# Patient Record
Sex: MALE | Race: WHITE | NOT HISPANIC OR LATINO | Employment: FULL TIME | URBAN - METROPOLITAN AREA
[De-identification: names, ages, dates, MRNs, and addresses within clinical notes are randomized per-mention and may not be internally consistent; named-entity substitution may affect disease eponyms.]

---

## 2017-03-13 DIAGNOSIS — B00.1 RECURRENT COLD SORES: Primary | ICD-10-CM

## 2017-03-13 RX ORDER — VALACYCLOVIR HYDROCHLORIDE 1 G/1
2000 TABLET, FILM COATED ORAL 2 TIMES DAILY
Qty: 4 TABLET | Refills: 3 | Status: SHIPPED | OUTPATIENT
Start: 2017-03-13 | End: 2017-04-04

## 2017-03-13 NOTE — TELEPHONE ENCOUNTER
Reason for call:  Patient reporting a symptom    Symptom or request: Cold Sore Lip-pt is requesting Acyclovir. Could he get this over the phone or does he need an appt.     Duration (how long have symptoms been present): started 3/12/17    Have you been treated for this before? Yes    Phone Number patient can be reached at:  Home number on file [phone] (home)    Best Time:  Any Time    Call taken on 3/13/2017 at 11:12 AM by Renee Salamanca

## 2017-03-13 NOTE — TELEPHONE ENCOUNTER
Disp Refills Start End JAUN   VALTREX 500 MG OR TABS (Discontinued) 10 Tab 4 2010 6/3/2010 --   Si TABLET TWICE DAILY   Class: Fax   Route: Oral   Reason for Discontinue: Therapy completed   Order: [id number]

## 2017-04-04 ENCOUNTER — HOSPITAL ENCOUNTER (OUTPATIENT)
Dept: MRI IMAGING | Facility: CLINIC | Age: 52
Discharge: HOME | End: 2017-04-04
Attending: NURSE PRACTITIONER | Admitting: NURSE PRACTITIONER
Payer: COMMERCIAL

## 2017-04-04 ENCOUNTER — OFFICE VISIT (OUTPATIENT)
Dept: FAMILY MEDICINE | Facility: CLINIC | Age: 52
End: 2017-04-04
Payer: COMMERCIAL

## 2017-04-04 VITALS
HEIGHT: 70 IN | HEART RATE: 60 BPM | TEMPERATURE: 98.3 F | SYSTOLIC BLOOD PRESSURE: 130 MMHG | WEIGHT: 261 LBS | BODY MASS INDEX: 37.37 KG/M2 | DIASTOLIC BLOOD PRESSURE: 80 MMHG

## 2017-04-04 DIAGNOSIS — M25.571 ACUTE RIGHT ANKLE PAIN: Primary | ICD-10-CM

## 2017-04-04 DIAGNOSIS — M25.571 ACUTE RIGHT ANKLE PAIN: ICD-10-CM

## 2017-04-04 PROCEDURE — 99214 OFFICE O/P EST MOD 30 MIN: CPT | Performed by: NURSE PRACTITIONER

## 2017-04-04 PROCEDURE — 73721 MRI JNT OF LWR EXTRE W/O DYE: CPT | Mod: RT

## 2017-04-04 NOTE — PROGRESS NOTES
"  SUBJECTIVE:                                                    Jaime Griffiths is a 51 year old male who presents to clinic today for the following health issues:      Joint Pain- right ankle     Onset: Sudden onset Tayo Morning    Description:   Location: right ankle  Character: Sharp and Dull ache    Intensity: moderate    Progression of Symptoms: worse    Accompanying Signs & Symptoms:  Other symptoms: radiation of pain to back of thigh on Monday   History:   Previous similar pain: YES      Precipitating factors:     Trauma or overuse: YES- Alleviating factors:  Improved by: NSAID - ibuprofen, Ice.      Therapies Tried and outcome: Ibuprofen, Ice, rest, relieves some pain     - Sudden onset on Sunday night. Pain so bad that he wanted to \"cut his foot off\" on Tayo morning.  - Pain is on medial side of ankle and posterior ankle at Achilles tendon.  - Ankle is swollen.  Patient states he has swelling at baseline due to multiple ankle sprains on the right side.   - Constant pain, which varies from dull ache to very sharp stabbing pain.  - Ankle pain generally happens twice a year due to sports injury. Typically with ibu and rest, resolves on its own.   - These past injuries have always been the right ankle when he turns his ankle out for example.   - No injury on Sunday prior to the onset of pain. He did go bowling the day before.  However, he plans with his left leg, so doesn't think this could be the cause of the right ankle pain  - Ice and ibu dulls pain a bit       Problem list and histories reviewed & adjusted, as indicated.  Additional history: as documented    Patient Active Problem List   Diagnosis     Obesity     Elevated BP     Recurrent cold sores     Adenomatous polyp of colon     CARDIOVASCULAR SCREENING; LDL GOAL LESS THAN 160     HTN (hypertension)     Past Surgical History:   Procedure Laterality Date     ARTHROSCOPY KNEE Right 12/4/2014    Procedure: ARTHROSCOPY KNEE;  Surgeon: Wally Mckay, " "MD;  Location: [facility] OR     SURGICAL HISTORY OF -       appendectomy       Social History   Substance Use Topics     Smoking status: Former Smoker     Quit date: 10/4/2014     Smokeless tobacco: Current User     Types: Chew     Alcohol use 12.0 oz/week     24 Cans of beer per week      Comment: 15 beers per week     Family History   Problem Relation Age of Onset     Cancer - colorectal Maternal Grandmother      DIABETES Maternal Grandfather      HEART DISEASE Father 64     onset           Reviewed and updated as needed this visit by clinical staff       Reviewed and updated as needed this visit by Provider         ROS:  Constitutional, HEENT, cardiovascular, pulmonary, gi and gu systems are negative, except as otherwise noted.    OBJECTIVE:                                                    /80  Pulse 60  Temp 98.3  F (36.8  C) (Tympanic)  Ht 5' 10.25\" (1.784 m)  Wt 261 lb (118.4 kg)  BMI 37.18 kg/m2  Body mass index is 37.18 kg/(m^2).  GENERAL: healthy, alert and no distress  MS: Right ankle, swollen and exquisitely tender at medial edge.  Pain is well posterior near the Achilles tendon just above heel.  Range of motion is limited in flexion, extension, and rotation due to pain.    Diagnostic Test Results:  MRI scheduled this afternoon.     ASSESSMENT/PLAN:                                                      1. Acute right ankle pain  Uncertain if this is a strain or sprain versus something more concerning.  He does have a history of sprains and strains, tendinitis on the left side. Painful to stand on.  Pain bothers him all the time and ibuprofen and Tylenol only take the edge off.  I think we should do an MRI for further assessment.  Patient agrees.  - MR Ankle Right w/o Contrast; Future  - diclofenac (VOLTAREN) 50 MG EC tablet; Take 1 tablet (50 mg) by mouth 3 times daily as needed for moderate pain  Dispense: 90 tablet; Refill: 1  - Stop all other NSAIDs while on diclofenac.  Patient " understands    Patient Instructions   Your MRI is scheduled for 1:30 today at the [facility]. Please arrive by 1:15    TT spent: 20 minutes of which 20 minutes were spent in direct face to face contact with patient/family. Patient teaching done regarding: follow up. Greater than 50% of time spent counseling and/or coordinating care.     Feli Farrell NP, APRN [facility]

## 2017-04-04 NOTE — MR AVS SNAPSHOT
After Visit Summary   4/4/2017    Jaime Griffiths    MRN: [id number]           Patient Information     Date Of Birth          1965        Visit Information        Provider Department      4/4/2017 9:20 AM Feli Farrell APRN [facility]        Today's Diagnoses     Acute right ankle pain    -  1      Care Instructions    Your MRI is scheduled for 1:30 today at the [facility]. Please arrive by 1:15        Follow-ups after your visit        Your next 10 appointments already scheduled     Apr 04, 2017  1:30 PM CDT   MR ANKLE RIGHT W/O CONTRAST with [facility]   [facility] ([facility])    [address] [address]   [phone]           Take your medicines as usual, unless your doctor tells you not to. Bring a list of your current medicines to your exam (including vitamins, minerals and over-the-counter drugs). Also bring the results of similar scans you may have had.  Please remove any body piercings and hair extensions before you arrive.  Follow your doctor s orders. If you do not, we may have to postpone your exam.  You will not have contrast for this exam. You do not need to do anything special to prepare.  The MRI machine uses a strong magnet. Please wear clothes without metal (snaps, zippers). A sweatsuit works well, or we may give you a hospital gown.   **IMPORTANT** THE INSTRUCTIONS BELOW ARE ONLY FOR THOSE PATIENTS WHO HAVE BEEN TOLD THEY WILL RECEIVE SEDATION OR GENERAL ANESTHESIA DURING THEIR MRI PROCEDURE:  IF YOU WILL RECEIVE SEDATION (take medicine to help you relax during your exam):   You must get the medicine from your doctor before you arrive. Bring the medicine to the exam. Do not take it at home.   Arrive one hour early. Bring someone who can take you home after the test. Your medicine will make you sleepy. After the exam, you may not drive, take a bus or take a taxi by yourself.   No eating 8 hours before your  "exam. You may have clear liquids up until 4 hours before your exam. (Clear liquids include water, clear tea, black coffee and fruit juice without pulp.)  IF YOU WILL RECEIVE ANESTHESIA (be asleep for your exam):   Arrive 1 1/2 hours early. Bring someone who can take you home after the test. You may not drive, take a bus or take a taxi by yourself.   No eating 8 hours before your exam. You may have clear liquids up until 4 hours before your exam. (Clear liquids include water, clear tea, black coffee and fruit juice without pulp.)   You will spend four to five hours in the recovery room.  Please call the Imaging Department at your exam site with any questions.              Future tests that were ordered for you today     Open Future Orders        Priority Expected Expires Ordered    MR Ankle Right w/o Contrast Routine  4/4/2018 4/4/2017            Who to contact     If you have questions or need follow up information about today's clinic visit or your schedule please contact [facility] directly at [phone].  Normal or non-critical lab and imaging results will be communicated to you by [employer], letter or phone within 4 business days after the clinic has received the results. If you do not hear from us within 7 days, please contact the clinic through [email or web address] or phone. If you have a critical or abnormal lab result, we will notify you by phone as soon as possible.  Submit refill requests through [employer] or call your pharmacy and they will forward the refill request to us. Please allow 3 business days for your refill to be completed.          Additional Information About Your Visit        StudySoup Information     StudySoup lets you send messages to your doctor, view your test results, renew your prescriptions, schedule appointments and more. To sign up, go to [email or web address] . Click on \"Log in\" on the left side of the screen, which will take you to the Welcome page. Then click on \"Sign up Now\" on " "the right side of the page.     You will be asked to enter the access code listed below, as well as some personal information. Please follow the directions to create your username and password.     Your access code is: [email or web address]  Expires: 7/3/2017  9:49 AM     Your access code will  in 90 days. If you need help or a new code, please call your [facility] clinic or [phone].        Care EveryWhere ID     This is your Care EveryWhere ID. This could be used by other organizations to access your [facility] medical records  [id number]        Your Vitals Were     Pulse Temperature Height BMI (Body Mass Index)          60 98.3  F (36.8  C) (Tympanic) 5' 10.25\" (1.784 m) 37.18 kg/m2         Blood Pressure from Last 3 Encounters:   17 130/80   10/28/16 (!) 169/93   16 132/80    Weight from Last 3 Encounters:   17 261 lb (118.4 kg)   16 247 lb 8 oz (112.3 kg)   16 247 lb 6.4 oz (112.2 kg)               Primary Care Provider Office Phone # Fax #    Rd Gayle [phone]       [facility] [address] [place] [address]        Thank you!     Thank you for choosing [facility]  for your care. Our goal is always to provide you with excellent care. Hearing back from our patients is one way we can continue to improve our services. Please take a few minutes to complete the written survey that you may receive in the mail after your visit with us. Thank you!             Your Updated Medication List - Protect others around you: Learn how to safely use, store and throw away your medicines at www.disposemymeds.org.      Notice  As of 2017  9:50 AM    You have not been prescribed any medications.      "

## 2017-04-05 ENCOUNTER — TELEPHONE (OUTPATIENT)
Dept: FAMILY MEDICINE | Facility: CLINIC | Age: 52
End: 2017-04-05

## 2017-04-06 NOTE — TELEPHONE ENCOUNTER
Pt informed as noted per Dr. Gayle. Will forward to DIVINA Robertson, for review upon her RTC clinic tomorrow.

## 2017-04-06 NOTE — TELEPHONE ENCOUNTER
Some swelling/inflammation and some chronic tendonitis and arthritis findings.   I would defer to Ailyn on correlating the MRI findings to his symptoms, as I didn't see him.  She  Can go over with in in detail.

## 2017-04-06 NOTE — TELEPHONE ENCOUNTER
Pt said he did not get a call back yet. Could the Nurse take a look at it?   [facility]

## 2017-09-05 ENCOUNTER — RADIANT APPOINTMENT (OUTPATIENT)
Dept: GENERAL RADIOLOGY | Facility: CLINIC | Age: 52
End: 2017-09-05
Attending: NURSE PRACTITIONER
Payer: COMMERCIAL

## 2017-09-05 ENCOUNTER — OFFICE VISIT (OUTPATIENT)
Dept: FAMILY MEDICINE | Facility: CLINIC | Age: 52
End: 2017-09-05
Payer: COMMERCIAL

## 2017-09-05 VITALS
SYSTOLIC BLOOD PRESSURE: 148 MMHG | BODY MASS INDEX: 36.79 KG/M2 | HEART RATE: 66 BPM | WEIGHT: 257 LBS | HEIGHT: 70 IN | DIASTOLIC BLOOD PRESSURE: 92 MMHG | TEMPERATURE: 97.9 F

## 2017-09-05 DIAGNOSIS — M25.572 ACUTE LEFT ANKLE PAIN: Primary | ICD-10-CM

## 2017-09-05 DIAGNOSIS — M25.572 ACUTE LEFT ANKLE PAIN: ICD-10-CM

## 2017-09-05 PROCEDURE — 99213 OFFICE O/P EST LOW 20 MIN: CPT | Performed by: NURSE PRACTITIONER

## 2017-09-05 PROCEDURE — 73610 X-RAY EXAM OF ANKLE: CPT | Mod: LT

## 2017-09-05 RX ORDER — INDOMETHACIN 25 MG/1
25 CAPSULE ORAL
Qty: 42 CAPSULE | Refills: 1 | Status: SHIPPED | OUTPATIENT
Start: 2017-09-05 | End: 2018-02-28

## 2017-09-05 NOTE — PROGRESS NOTES
SUBJECTIVE:   Jaime Griffiths is a 52 year old male who presents to clinic today for the following health issues:      Joint Pain    Onset: 1 day ago    Description:   Location: left ankle  Character: Sharp, Dull ache, Gnawing and Burning    Intensity: severe    Progression of Symptoms: worse    Accompanying Signs & Symptoms:  Other symptoms: swelling    History:   Previous similar pain: YES- tendonitis      Precipitating factors:   Trauma or overuse: no     Alleviating factors:  Improved by: nothing    Therapies Tried and outcome: none    Last night slept only 2 hours.  Pain started yesterday mornig, took advil and worsened as day went by.   2 days ago was out working in the [employer] in [place], no injury but wonders if they may have contributed.   Sunday OK, felt fine.    Problem list and histories reviewed & adjusted, as indicated.  Additional history: as documented    Patient Active Problem List   Diagnosis     Obesity     Elevated BP     Recurrent cold sores     Adenomatous polyp of colon     CARDIOVASCULAR SCREENING; LDL GOAL LESS THAN 160     HTN (hypertension)     Past Surgical History:   Procedure Laterality Date     ARTHROSCOPY KNEE Right 12/4/2014    Procedure: ARTHROSCOPY KNEE;  Surgeon: Wally Mckay MD;  Location: [facility]     SURGICAL HISTORY OF -       appendectomy       Social History   Substance Use Topics     Smoking status: Former Smoker     Quit date: 10/4/2014     Smokeless tobacco: Current User     Types: Chew     Alcohol use 12.0 oz/week     24 Cans of beer per week      Comment: 15 beers per week     Family History   Problem Relation Age of Onset     Cancer - colorectal Maternal Grandmother      DIABETES Maternal Grandfather      HEART DISEASE Father 64     onset         BP Readings from Last 3 Encounters:   09/14/17 (!) 148/102   09/05/17 (!) 148/92   04/04/17 130/80    Wt Readings from Last 3 Encounters:   09/14/17 257 lb (116.6 kg)   09/06/17 257 lb (116.6 kg)   09/05/17 257 lb (116.6 kg)     "        Reviewed and updated as needed this visit by clinical staffTobacco  Allergies  Meds  Problems  Med Hx  Surg Hx  Fam Hx  Soc Hx        Reviewed and updated as needed this visit by Provider  Allergies  Meds  Problems         ROS:  Constitutional, HEENT, cardiovascular, pulmonary, GI, , musculoskeletal, neuro, skin, endocrine and psych systems are negative, except as otherwise noted.      OBJECTIVE:   BP (!) 148/92  Pulse 66  Temp 97.9  F (36.6  C) (Tympanic)  Ht 5' 10.25\" (1.784 m)  Wt 257 lb (116.6 kg)  BMI 36.61 kg/m2  Body mass index is 36.61 kg/(m^2).  GENERAL: healthy, alert and no distress  NECK: no adenopathy, no asymmetry, masses, or scars and thyroid normal to palpation  RESP: lungs clear to auscultation - no rales, rhonchi or wheezes  CV: regular rate and rhythm, normal S1 S2, no S3 or S4, no murmur, click or rub, no peripheral edema and peripheral pulses strong  MS: left ankle is mildly swollen, pain noted with palpation if medial malleolus and surrounding tissue    Diagnostic Test Results:  Xray - LEFT ANKLE THREE OR MORE VIEWS  9/5/2017 2:44 PM      HISTORY: Acute left medial malleolus pain, foot and ankle swelling.  Past MRI done in 2014 due to pain and swelling.     COMPARISON: September 16, 2010         IMPRESSION: Ankle mortise intact. Ossicles again noted adjacent to the  medial malleolus, similar in appearance since previous exam. Mild soft  tissue swelling throughout the level of the ankle. No definite acute  fracture.     KYLEIGH MARTINEZ MD    ASSESSMENT/PLAN:     ASSESSMENT:  1. Acute left ankle pain        PLAN:  Orders Placed This Encounter     XR Ankle Left G/E 3 Views     indomethacin (INDOCIN) 25 MG capsule       Patient Instructions   You have an appointment with Dr. Mcclellan here on Wednesday at 3:20 pm. Please arrive 15 minutes early.    Take the Indocin 25 mg orally 3 times a day with meals.    No other NSAIDS ( Ibuprofen) while on the Indocin    Ok to take Tylenol " [facility] the [facility]      Feli Farrell, NP, APRN CNP  [facility]

## 2017-09-05 NOTE — LETTER
September 5, 2017      Jaime Griffiths  [address]  [address]   [place] [address]        To Whom It May Concern:    Jaime Griffiths was seen in our clinic. He may return to work on or about 9/7/17.      Sincerely,        Feli Farrell, NP, APRN CNP

## 2017-09-05 NOTE — NURSING NOTE
"Chief Complaint   Patient presents with     Musculoskeletal Problem     Rt. ankle       Initial There were no vitals taken for this visit. Estimated body mass index is 37.18 kg/(m^2) as calculated from the following:    Height as of 4/4/17: 5' 10.25\" (1.784 m).    Weight as of 4/4/17: 261 lb (118.4 kg).  Medication Reconciliation: complete   Fanta Son CMA      "

## 2017-09-05 NOTE — PATIENT INSTRUCTIONS
You have an appointment with Dr. Mcclellan here on Wednesday at 3:20 pm. Please arrive 15 minutes early.    Take the Indocin 25 mg orally 3 times a day with meals.    No other NSAIDS ( Ibuprofen) while on the Indocin    Ok to take Tylenol while on the Indocin

## 2017-09-05 NOTE — MR AVS SNAPSHOT
"              After Visit Summary   9/5/2017    Jaime Griffiths    MRN: [id number]           Patient Information     Date Of Birth          1965        Visit Information        Provider Department      9/5/2017 1:40 PM Feli Farrell APRN CNP [facility]        Today's Diagnoses     Acute left ankle pain    -  1      Care Instructions    You have an appointment with Dr. Mcclellan here on Wednesday at 3:20 pm. Please arrive 15 minutes early.    Take the Indocin 25 mg orally 3 times a day with meals.    No other NSAIDS ( Ibuprofen) while on the Indocin    Ok to take Tylenol while on the Indocin          Follow-ups after your visit        Your next 10 appointments already scheduled     Sep 06, 2017  3:20 PM CDT   New Visit with Salinas Mcclellan DPM   [facility] ([facility])    [address] [place] [address] [phone]              Who to contact     If you have questions or need follow up information about today's clinic visit or your schedule please contact [facility] directly at [phone].  Normal or non-critical lab and imaging results will be communicated to you by [employer], letter or phone within 4 business days after the clinic has received the results. If you do not hear from us within 7 days, please contact the clinic through [employer] or phone. If you have a critical or abnormal lab result, we will notify you by phone as soon as possible.  Submit refill requests through [employer] or call your pharmacy and they will forward the refill request to us. Please allow 3 business days for your refill to be completed.          Additional Information About Your Visit        MyChart Information     Contix lets you send messages to your doctor, view your test results, renew your prescriptions, schedule appointments and more. To sign up, go to [email or web address] . Click on \"Log in\" on the left side of the screen, which will take you to " "the Welcome page. Then click on \"Sign up Now\" on the right side of the page.     You will be asked to enter the access code listed below, as well as some personal information. Please follow the directions to create your username and password.     Your access code is: [email or web address]  Expires: 2017  2:47 PM     Your access code will  in 90 days. If you need help or a new code, please call your [facility] clinic or [phone].        Care EveryWhere ID     This is your Care EveryWhere ID. This could be used by other organizations to access your [facility] medical records  [id number]        Your Vitals Were     Pulse Temperature Height BMI (Body Mass Index)          66 97.9  F (36.6  C) (Tympanic) 5' 10.25\" (1.784 m) 36.61 kg/m2         Blood Pressure from Last 3 Encounters:   17 (!) 148/92   17 130/80   10/28/16 (!) 169/93    Weight from Last 3 Encounters:   17 257 lb (116.6 kg)   17 261 lb (118.4 kg)   16 247 lb 8 oz (112.3 kg)                 Today's Medication Changes          These changes are accurate as of: 17  2:47 PM.  If you have any questions, ask your nurse or doctor.               Start taking these medicines.        Dose/Directions    indomethacin 25 MG capsule   Commonly known as:  INDOCIN   Used for:  Acute left ankle pain   Started by:  Feli Farrell APRN CNP        Dose:  25 mg   Take 1 capsule (25 mg) by mouth 3 times daily (with meals)   Quantity:  42 capsule   Refills:  1            Where to get your medicines      These medications [place] [facility] [place] [place] [address]  [address] [place] [address] [address], [facility] [address]     Phone:  [phone]     indomethacin 25 MG capsule                Primary Care Provider Office Phone # Fax #    Rd Gayle [phone]       [address] [facility] [address]        Equal Access to Services     BRIT MORA AH: christina Everett, " marissa susimplinda chávezjacquelineshlomoorlando sudeepnatalie corbin. So [facility] [phone].    ATENCIÓN: Si gabe wu, tiene a bruce disposición servicios gratuitos de asistencia lingüística. Yasmeen al [phone].    We comply with applicable federal civil rights laws and [place] laws. We do not discriminate on the basis of race, color, national origin, age, disability sex, sexual orientation or gender identity.            Thank you!     Thank you for choosing [facility]  for your care. Our goal is always to provide you with excellent care. Hearing back from our patients is one way we can continue to improve our services. Please take a few minutes to complete the written survey that you may receive in the mail after your visit with us. Thank you!             Your Updated Medication List - Protect others around you: Learn how to safely use, store and throw away your medicines at www.disposemymeds.org.          This list is accurate as of: 9/5/17  2:47 PM.  Always use your most recent med list.                   Brand Name Dispense Instructions for use Diagnosis    diclofenac 50 MG EC tablet    VOLTAREN    90 tablet    Take 1 tablet (50 mg) by mouth 3 times daily as needed for moderate pain    Acute right ankle pain       indomethacin 25 MG capsule    INDOCIN    42 capsule    Take 1 capsule (25 mg) by mouth 3 times daily (with meals)    Acute left ankle pain

## 2017-09-06 ENCOUNTER — OFFICE VISIT (OUTPATIENT)
Dept: PODIATRY | Facility: CLINIC | Age: 52
End: 2017-09-06
Payer: COMMERCIAL

## 2017-09-06 VITALS — HEIGHT: 70 IN | WEIGHT: 257 LBS | BODY MASS INDEX: 36.79 KG/M2

## 2017-09-06 DIAGNOSIS — M77.52 LEFT ANKLE TENDINITIS: ICD-10-CM

## 2017-09-06 DIAGNOSIS — M10.072 ACUTE IDIOPATHIC GOUT OF LEFT ANKLE: Primary | ICD-10-CM

## 2017-09-06 PROCEDURE — 99203 OFFICE O/P NEW LOW 30 MIN: CPT | Performed by: PODIATRIST

## 2017-09-06 RX ORDER — METHYLPREDNISOLONE 4 MG
4 TABLET, DOSE PACK ORAL SEE ADMIN INSTRUCTIONS
Qty: 21 TABLET | Refills: 0 | Status: SHIPPED | OUTPATIENT
Start: 2017-09-06 | End: 2017-09-14

## 2017-09-06 NOTE — PATIENT INSTRUCTIONS
Initial musculoskeletal treatment recommendation:    1.  Stretch the calf muscles as instructed once an hour.  2.  Ice the injured area in the evening; 20 min on/off.  3.  Take antiinflammatory medication as directed.  4.  Massage the soft tissues around the injured area in the morning to loosen the tissue  5.  Wear supportive foot wear and/or arch supports (rigid not cushion).      If no improvement in symptoms within four to six weeks, return to clinic for reevaluation.  GOUT  Gout is a disorder that results from the build-up of uric acid in the tissues or a joint. It most often affects the joint of the big toe.    CAUSES  Gout attacks are caused by deposits of crystallized uric acid in the joint. Uric acid is present in the blood and eliminated in the urine, but in people who have gout, uric acid accumulates and crystallizes in the joints. Uric acid is the result of the breakdown of purines, chemicals that are found naturally in our bodies and in food. Some people develop gout because their kidneys have difficulty eliminating normal amounts of uric acid, while others produce too much uric acid.  Gout occurs most commonly in the big toe because uric acid is sensitive to temperature changes. At cooler temperatures, uric acid turns into crystals. Since the toe is the part of the body that is farthest from the heart, it s also the coolest part of the body   and, thus, the most likely target of gout. However, gout can affect any joint in the body.  The tendency to accumulate uric acid is often inherited. Other factors that put a person at risk for developing gout include: high blood pressure, diabetes, obesity, surgery, chemotherapy, stress, and certain medications and vitamins. For example, the body s ability to remove uric acid can be negatively affected by taking aspirin, some diuretic medications ( water pills ), and the vitamin niacin (also called nicotinic acid). While gout is more common in men aged 40 to 60  years, it can occur in younger men as well as in women.  Consuming foods and beverages that contain high levels of purines can trigger an attack of gout. Some foods contain more purines than others and have been associated with an increase of uric acid, which leads to gout. You may be able to reduce your chances of getting a gout attack by limiting or avoiding shellfish, organ meats (kidney, liver, etc.), red wine, beer, and red meat.  Other Triggers include but are not limited to:  Congestive heart failure, deep vein thrombosis, pneumonia, fasting, dehydration, trauma/surgery, psoriasis.  SYMTOMS  An attack of gout can be miserable, marked by the following symptoms:  Intense pain that comes on suddenly   often in the middle of the night or upon arising   Signs of inflammation such as redness, swelling, and warmth over the joint.   DIAGNOSIS  To diagnose gout, the foot and ankle surgeon will ask questions about your personal and family medical history, followed by an examination of the affected joint. Laboratory tests and x-rays are sometimes ordered to determine if the inflammation is caused by something other than gout.  TREATMENT  Initial treatment of an attack of gout typically includes the following:  Medications. Prescription medications or injections are used to treat the pain, swelling, and inflammation.   Dietary restrictions. Foods and beverages that are high in purines should be avoided, since purines are converted in the body to uric acid.   Fluids. Drink plenty of water and other fluids each day, while also avoiding alcoholic beverages, which cause dehydration. Cherry Juice works well to help decrease pain.  Immobilize and elevate the foot. Avoid standing and walking to give your foot a rest. Also, elevate your foot (level with or slightly above the heart) to help reduce swelling.   The symptoms of gout and the inflammatory process usually resolve in three to ten days with treatment. If gout symptoms  continue despite the initial treatment, or if repeated attacks occur, see your primary care physician for maintenance treatment that may involve daily medication. In cases of repeated episodes, the underlying problem must be addressed, as the build-up of uric acid over time can cause arthritic damage to the joint.  DIET CHANGE  A gout diet reduces your intake of foods that are high in purines, which helps control your body's production of uric acid. If you're overweight or obese, lose weight. However, avoid fasting and rapid weight loss because these can promote a gout attack. Drink plenty of fluids to help flush uric acid from your body. Also avoid high-protein diets, which can cause you to produce too much uric acid (hyperuricemia).   To follow the diet:   Limit meat, poultry and fish. Animal proteins are high in purine. Avoid or severely limit high-purine foods, such as organ meats, herring, anchovies and mackerel. Red meat (beef, pork and lamb), fatty fish and seafood (tuna, shrimp, lobster and scallops) are associated with increased risk of gout. Because all meat, poultry and fish contain purines, limit your intake to 4 to 6 ounces (113 to 170 grams) daily.   Eat more plant-based proteins. You can increase your protein by including more plant-based sources, such as beans and legumes. This switch will also help you cut down on saturated fats, which may indirectly contribute to obesity and gout.   Limit or avoid alcohol. Alcohol interferes with the elimination of uric acid from your body. Drinking beer, in particular, has been linked to gout attacks. If you're having an attack, avoid alcohol. However, when you're not having an attack, drinking one or two 5-ounce (148 milliliter) servings a day of wine is not likely to increase your risk.   Drink plenty of fluids, particularly water. Fluids can help remove uric acid from your body. Aim for eight to 16 8-ounce (237 milliliter) glasses a day.   Choose low-fat or  fat-free dairy products. Some studies have shown that drinking skim or low-fat milk and eating foods made with them, such as yogurt, help reduce the risk of gout. Aim for adequate dairy intake of 16 to 24 fluid ounces (473 to 710 milliliters) daily.   Choose complex carbohydrates. Eat more whole grains and fruits and vegetables and fewer refined carbohydrates, such as white bread, cakes and candy.   Limit or avoid sugar. Too many sweets can leave you with no room for plant-based proteins and low-fat or fat-free dairy products   the foods you need to avoid gout. Sugary foods also tend to be high in calories, so they make it easier to eat more than you're likely to burn off. Although there's debate about whether sugar has a direct effect on uric acid levels, sweets are definitely linked to overweight and obesity.   There's also some evidence that drinking four to six cups of coffee a day lowers gout risk in men.   RESULTS  Following a gout diet can help you limit your body's uric acid production and increase its elimination. It's not likely to lower the uric acid concentration in your blood enough to treat your gout without medication, but it may help decrease the number of attacks and limit their severity. Following the gout diet and limiting your calories   particularly if you also add in moderate daily exercise, such as brisk walking   also can improve your overall health by helping you achieve and maintain a healthy weight.

## 2017-09-06 NOTE — LETTER
9/6/2017         RE: Jaime Griffiths  [address]  [address]   [place] [id number]        Dear Colleague,    Thank you for referring your patient, Jaime Griffiths, to the [facility]. Please see a copy of my visit note below.    PATIENT HISTORY:  Jaime Griffiths is a 52 year old male who presents to clinic for a painful left foot .  The patient describes the pain as sharp stabbing.  The patient relates the pain level is moderate.  The patient relates pain is located in the back of the left ankle.  The patient relates the pain has been present for the past several days.  The patient relates pain with ambulation.  The patient has tried ice with little relief.        REVIEW OF SYSTEMS:  Constitutional, HEENT, cardiovascular, pulmonary, GI, , musculoskeletal, neuro, skin, endocrine and psych systems are negative, except as otherwise noted.     PAST MEDICAL HISTORY:   Past Medical History:   Diagnosis Date     Allergic rhinitis, cause unspecified         PAST SURGICAL HISTORY:   Past Surgical History:   Procedure Laterality Date     ARTHROSCOPY KNEE Right 12/4/2014    Procedure: ARTHROSCOPY KNEE;  Surgeon: Wally Mckay MD;  Location: [facility] OR     SURGICAL HISTORY OF -       appendectomy        MEDICATIONS:   Current Outpatient Prescriptions:      order for DME, Trilok Ankle Brace, Disp: 1 Device, Rfl: 0     methylPREDNISolone (MEDROL DOSEPAK) 4 MG tablet, Take 1 tablet (4 mg) by mouth See Admin Instructions, Disp: 21 tablet, Rfl: 0     indomethacin (INDOCIN) 25 MG capsule, Take 1 capsule (25 mg) by mouth 3 times daily (with meals), Disp: 42 capsule, Rfl: 1     diclofenac (VOLTAREN) 50 MG EC tablet, Take 1 tablet (50 mg) by mouth 3 times daily as needed for moderate pain, Disp: 90 tablet, Rfl: 1     ALLERGIES:    Allergies   Allergen Reactions     Nka [No Known Allergies]         SOCIAL HISTORY:   Social History     Social History     Marital status:      Spouse name: N/A     Number of  "children: N/A     Years of education: N/A     Occupational History     Not on file.     Social History Main Topics     Smoking status: Former Smoker     Quit date: 10/4/2014     Smokeless tobacco: Current User     Types: Chew     Alcohol use 12.0 oz/week     24 Cans of beer per week      Comment: 15 beers per week     Drug use: No     Sexual activity: Yes     Partners: Female     Other Topics Concern     Parent/Sibling W/ Cabg, Mi Or Angioplasty Before 65f 55m? No     father     Social History Narrative        FAMILY HISTORY:   Family History   Problem Relation Age of Onset     Cancer - colorectal Maternal Grandmother      DIABETES Maternal Grandfather      HEART DISEASE Father 64     onset        EXAM:Vitals: Ht 1.784 m (5' 10.25\")  Wt 116.6 kg (257 lb)  BMI 36.61 kg/m2  BMI= Body mass index is 36.61 kg/(m^2).    Weight management plan: Patient was referred to their PCP to discuss a diet and exercise plan.    General appearance: Patient is alert and fully cooperative with history & exam.  No sign of distress is noted during the visit.     Psychiatric: Affect is pleasant & appropriate.  Patient appears motivated to improve health.     Respiratory: Breathing is regular & unlabored while sitting.     HEENT: Hearing is intact to spoken word.  Speech is clear.  No gross evidence of visual impairment that would impact ambulation.     Dermatologic: Skin is intact to both lower extremities without significant lesions, rash or abrasion.  No paronychia or evidence of soft tissue infection is noted.     Vascular: DP & PT pulses are intact & regular bilaterally.  No significant edema or varicosities noted.  CFT and skin temperature is normal to both lower extremities.     Neurologic: Lower extremity sensation is intact to light touch.  No evidence of weakness or contracture in the lower extremities.  No evidence of neuropathy.     Musculoskeletal: Patient is ambulatory without assistive device or brace.  No gross ankle " deformity noted.  No foot or ankle joint effusion is noted.  No pain on palpation over the extensor tendons on the left ankle. No surrounding erythema noted. No notes moderate edema.    Radiographs were evaluated including AP, lateral and medial oblique views of the left foot reveals  no cortical erosions or periosteal elevation.  All joint margins appear stable.  There is no apparent fracture or tumor formation noted.  There is no evidence of foreign body.    ASSESSMENT / PLAN:     ICD-10-CM    1. Acute idiopathic gout of left ankle M10.072 methylPREDNISolone (MEDROL DOSEPAK) 4 MG tablet   2. Left ankle tendinitis M77.52 order for DME     methylPREDNISolone (MEDROL DOSEPAK) 4 MG tablet       I have explained to Jaime  about the conditions.  We discussed the nature of the condition as well as the treatment plan and expected length of recovery.  At this time, the patient was instructed on icing, stretching, tissue massage and support.  The patient was fitted with a Tri-Lock ankle brace that will aid in offloading the tension forces to the soft tissues and prevent further inflammation.  To reduce the amount of current inflammation, the patient was prescribed a Medrol Dosepak to be taken daily with food and instructed to stop taking if any stomach irritation or swelling in extremities are noted.  The patient will return in four weeks for reevaluation if the symptoms do not resolve.          Disclaimer: This note consists of symbols derived from keyboarding, dictation and/or voice recognition software. As a result, there may be errors in the script that have gone undetected. Please consider this when interpreting information found in this chart.       DIAZ Mcclellan D.P.M., F.A.C.F.A.S.        Again, thank you for allowing me to participate in the care of your patient.        Sincerely,        Salinas Mcclellan DPM

## 2017-09-06 NOTE — MR AVS SNAPSHOT
After Visit Summary   9/6/2017    Jaime Griffiths    MRN: [id number]           Patient Information     Date Of Birth          1965        Visit Information        Provider Department      9/6/2017 3:20 PM Salinas Mcclellan DPM [facility]        Today's Diagnoses     Acute idiopathic gout of left ankle    -  1    Left ankle tendinitis          Care Instructions    Initial musculoskeletal treatment recommendation:    1.  Stretch the calf muscles as instructed once an hour.  2.  Ice the injured area in the evening; 20 min on/off.  3.  Take antiinflammatory medication as directed.  4.  Massage the soft tissues around the injured area in the morning to loosen the tissue  5.  Wear supportive foot wear and/or arch supports (rigid not cushion).      If no improvement in symptoms within four to six weeks, return to clinic for reevaluation.  GOUT  Gout is a disorder that results from the build-up of uric acid in the tissues or a joint. It most often affects the joint of the big toe.    CAUSES  Gout attacks are caused by deposits of crystallized uric acid in the joint. Uric acid is present in the blood and eliminated in the urine, but in people who have gout, uric acid accumulates and crystallizes in the joints. Uric acid is the result of the breakdown of purines, chemicals that are found naturally in our bodies and in food. Some people develop gout because their kidneys have difficulty eliminating normal amounts of uric acid, while others produce too much uric acid.  Gout occurs most commonly in the big toe because uric acid is sensitive to temperature changes. At cooler temperatures, uric acid turns into crystals. Since the toe is the part of the body that is farthest from the heart, it s also the coolest part of the body - and, thus, the most likely target of gout. However, gout can affect any joint in the body.  The tendency to accumulate uric acid is often inherited. Other factors that  put a person at risk for developing gout include: high blood pressure, diabetes, obesity, surgery, chemotherapy, stress, and certain medications and vitamins. For example, the body s ability to remove uric acid can be negatively affected by taking aspirin, some diuretic medications ( water pills ), and the vitamin niacin (also called nicotinic acid). While gout is more common in men aged 40 to 60 years, it can occur in younger men as well as in women.  Consuming foods and beverages that contain high levels of purines can trigger an attack of gout. Some foods contain more purines than others and have been associated with an increase of uric acid, which leads to gout. You may be able to reduce your chances of getting a gout attack by limiting or avoiding shellfish, organ meats (kidney, liver, etc.), red wine, beer, and red meat.  Other Triggers include but are not limited to:  Congestive heart failure, deep vein thrombosis, pneumonia, fasting, dehydration, trauma/surgery, psoriasis.  SYMTOMS  An attack of gout can be miserable, marked by the following symptoms:  Intense pain that comes on suddenly - often in the middle of the night or upon arising   Signs of inflammation such as redness, swelling, and warmth over the joint.   DIAGNOSIS  To diagnose gout, the foot and ankle surgeon will ask questions about your personal and family medical history, followed by an examination of the affected joint. Laboratory tests and x-rays are sometimes ordered to determine if the inflammation is caused by something other than gout.  TREATMENT  Initial treatment of an attack of gout typically includes the following:  Medications. Prescription medications or injections are used to treat the pain, swelling, and inflammation.   Dietary restrictions. Foods and beverages that are high in purines should be avoided, since purines are converted in the body to uric acid.   Fluids. Drink plenty of water and other fluids each day, while also  avoiding alcoholic beverages, which cause dehydration. Cherry Juice works well to help decrease pain.  Immobilize and elevate the foot. Avoid standing and walking to give your foot a rest. Also, elevate your foot (level with or slightly above the heart) to help reduce swelling.   The symptoms of gout and the inflammatory process usually resolve in three to ten days with treatment. If gout symptoms continue despite the initial treatment, or if repeated attacks occur, see your primary care physician for maintenance treatment that may involve daily medication. In cases of repeated episodes, the underlying problem must be addressed, as the build-up of uric acid over time can cause arthritic damage to the joint.  DIET CHANGE  A gout diet reduces your intake of foods that are high in purines, which helps control your body's production of uric acid. If you're overweight or obese, lose weight. However, avoid fasting and rapid weight loss because these can promote a gout attack. Drink plenty of fluids to help flush uric acid from your body. Also avoid high-protein diets, which can cause you to produce too much uric acid (hyperuricemia).   To follow the diet:   Limit meat, poultry and fish. Animal proteins are high in purine. Avoid or severely limit high-purine foods, such as organ meats, herring, anchovies and mackerel. Red meat (beef, pork and lamb), fatty fish and seafood (tuna, shrimp, lobster and scallops) are associated with increased risk of gout. Because all meat, poultry and fish contain purines, limit your intake to 4 to 6 ounces (113 to 170 grams) daily.   Eat more plant-based proteins. You can increase your protein by including more plant-based sources, such as beans and legumes. This switch will also help you cut down on saturated fats, which may indirectly contribute to obesity and gout.   Limit or avoid alcohol. Alcohol interferes with the elimination of uric acid from your body. Drinking beer, in particular,  has been linked to gout attacks. If you're having an attack, avoid alcohol. However, when you're not having an attack, drinking one or two 5-ounce (148 milliliter) servings a day of wine is not likely to increase your risk.   Drink plenty of fluids, particularly water. Fluids can help remove uric acid from your body. Aim for eight to 16 8-ounce (237 milliliter) glasses a day.   Choose low-fat or fat-free dairy products. Some studies have shown that drinking skim or low-fat milk and eating foods made with them, such as yogurt, help reduce the risk of gout. Aim for adequate dairy intake of 16 to 24 fluid ounces (473 to 710 milliliters) daily.   Choose complex carbohydrates. Eat more whole grains and fruits and vegetables and fewer refined carbohydrates, such as white bread, cakes and candy.   Limit or avoid sugar. Too many sweets can leave you with no room for plant-based proteins and low-fat or fat-free dairy products -- the foods you need to avoid gout. Sugary foods also tend to be high in calories, so they make it easier to eat more than you're likely to burn off. Although there's debate about whether sugar has a direct effect on uric acid levels, sweets are definitely linked to overweight and obesity.   There's also some evidence that drinking four to six cups of coffee a day lowers gout risk in men.   RESULTS  Following a gout diet can help you limit your body's uric acid production and increase its elimination. It's not likely to lower the uric acid concentration in your blood enough to treat your gout without medication, but it may help decrease the number of attacks and limit their severity. Following the gout diet and limiting your calories -- particularly if you also add in moderate daily exercise, such as brisk walking -- also can improve your overall health by helping you achieve and maintain a healthy weight.                   Follow-ups after your visit        Follow-up notes from your care team      "Return in about 4 weeks (around 10/4/2017), or if symptoms worsen or fail to improve.      Who to contact     If you have questions or need follow up information about today's clinic visit or your schedule please contact [facility] directly at [phone].  Normal or non-critical lab and imaging results will be communicated to you by MyChart, letter or phone within 4 business days after the clinic has received the results. If you do not hear from us within 7 days, please contact the clinic through MyChart or phone. If you have a critical or abnormal lab result, we will notify you by phone as soon as possible.  Submit refill requests through [facility] or call your pharmacy and they will forward the refill request to us. Please allow 3 business days for your refill to be completed.          Additional Information About Your Visit        MyChart Information     [facility]-21 lets you send messages to your doctor, view your test results, renew your prescriptions, schedule appointments and more. To sign up, go to [email or web address] . Click on \"Log in\" on the left side of the screen, which will take you to the Welcome page. Then click on \"Sign up Now\" on the right side of the page.     You will be asked to enter the access code listed below, as well as some personal information. Please follow the directions to create your username and password.     Your access code is: [email or web address]  Expires: 2017  2:47 PM     Your access code will  in 90 days. If you need help or a new code, please call your [facility] or [phone].        Care EveryWhere ID     This is your Care EveryWhere ID. This could be used by other organizations to access your [facility] medical records  [id number]        Your Vitals Were     Height BMI (Body Mass Index)                1.784 m (5' 10.25\") 36.61 kg/m2           Blood Pressure from Last 3 Encounters:   17 (!) 148/92   17 130/80   10/28/16 (!) 169/93    " Weight from Last 3 Encounters:   09/06/17 116.6 kg (257 lb)   09/05/17 116.6 kg (257 lb)   04/04/17 118.4 kg (261 lb)              Today, you had the following     No orders found for display         Today's Medication Changes          These changes are accurate as of: 9/6/17  3:38 PM.  If you have any questions, ask your nurse or doctor.               Start taking these medicines.        Dose/Directions    methylPREDNISolone 4 MG tablet   Commonly known as:  MEDROL DOSEPAK   Used for:  Acute idiopathic gout of left ankle, Left ankle tendinitis   Started by:  Salinas Mcclellan DPM        Dose:  4 mg   Take 1 tablet (4 mg) by mouth See Admin Instructions   Quantity:  21 tablet   Refills:  0       order for DME   Used for:  Left ankle tendinitis   Started by:  Salinas Mcclellan DPM        Trilok Ankle Brace   Quantity:  1 Device   Refills:  0            Where to get your medicines      These medications were sent to [facility] [address] [address]     Phone:  [phone]     methylPREDNISolone 4 MG tablet         Some of these will need a paper prescription and others can be bought over the counter.  Ask your nurse if you have questions.     Bring a paper prescription for each of these medications     order for DME                Primary Care Provider Office Phone # Fax #    Rd Gayle [phone]       [address] [place] [phone]        Equal Access to Services     [facility]INE [facility] : Hadii alia marlowo Soblake, waaxda luqadaha, qaybta kaalmada adeegyada, ivone martinez . So [facility] [phone].    ATENCIÓN: Si habla español, tiene a bruce disposición servicios gratuitos de asistencia lingüística. Llame al [phone].    We comply with applicable federal civil rights laws and [place] laws. We do not discriminate on the basis of race, color, national origin, age, disability sex,  sexual orientation or gender identity.            Thank you!     Thank you for choosing [facility]  for your care. Our goal is always to provide you with excellent care. Hearing back from our patients is one way we can continue to improve our services. Please take a few minutes to complete the written survey that you may receive in the mail after your visit with us. Thank you!             Your Updated Medication List - Protect others around you: Learn how to safely use, store and throw away your medicines at www.disposemymeds.org.          This list is accurate as of: 9/6/17  3:38 PM.  Always use your most recent med list.                   Brand Name Dispense Instructions for use Diagnosis    diclofenac 50 MG EC tablet    VOLTAREN    90 tablet    Take 1 tablet (50 mg) by mouth 3 times daily as needed for moderate pain    Acute right ankle pain       indomethacin 25 MG capsule    INDOCIN    42 capsule    Take 1 capsule (25 mg) by mouth 3 times daily (with meals)    Acute left ankle pain       methylPREDNISolone 4 MG tablet    MEDROL DOSEPAK    21 tablet    Take 1 tablet (4 mg) by mouth See Admin Instructions    Acute idiopathic gout of left ankle, Left ankle tendinitis       order for DME     1 Device    Trilok Ankle Brace    Left ankle tendinitis

## 2017-09-06 NOTE — PROGRESS NOTES
PATIENT HISTORY:  Jaime Griffiths is a 52 year old male who presents to clinic for a painful left foot .  The patient describes the pain as sharp stabbing.  The patient relates the pain level is moderate.  The patient relates pain is located in the back of the left ankle.  The patient relates the pain has been present for the past several days.  The patient relates pain with ambulation.  The patient has tried ice with little relief.        REVIEW OF SYSTEMS:  Constitutional, HEENT, cardiovascular, pulmonary, GI, , musculoskeletal, neuro, skin, endocrine and psych systems are negative, except as otherwise noted.     PAST MEDICAL HISTORY:   Past Medical History:   Diagnosis Date     Allergic rhinitis, cause unspecified         PAST SURGICAL HISTORY:   Past Surgical History:   Procedure Laterality Date     ARTHROSCOPY KNEE Right 12/4/2014    Procedure: ARTHROSCOPY KNEE;  Surgeon: Wally Mckay MD;  Location: [facility] OR     SURGICAL HISTORY OF -       appendectomy        MEDICATIONS:   Current Outpatient Prescriptions:      order for DME, Trilok Ankle Brace, Disp: 1 Device, Rfl: 0     methylPREDNISolone (MEDROL DOSEPAK) 4 MG tablet, Take 1 tablet (4 mg) by mouth See Admin Instructions, Disp: 21 tablet, Rfl: 0     indomethacin (INDOCIN) 25 MG capsule, Take 1 capsule (25 mg) by mouth 3 times daily (with meals), Disp: 42 capsule, Rfl: 1     diclofenac (VOLTAREN) 50 MG EC tablet, Take 1 tablet (50 mg) by mouth 3 times daily as needed for moderate pain, Disp: 90 tablet, Rfl: 1     ALLERGIES:    Allergies   Allergen Reactions     Nka [No Known Allergies]         SOCIAL HISTORY:   Social History     Social History     Marital status:      Spouse name: N/A     Number of children: N/A     Years of education: N/A     Occupational History     Not on file.     Social History Main Topics     Smoking status: Former Smoker     Quit date: 10/4/2014     Smokeless tobacco: Current User     Types: Chew     Alcohol use 12.0 oz/week  "    24 Cans of beer per week      Comment: 15 beers per week     Drug use: No     Sexual activity: Yes     Partners: Female     Other Topics Concern     Parent/Sibling W/ Cabg, Mi Or Angioplasty Before 65f 55m? No     father     Social History Narrative        FAMILY HISTORY:   Family History   Problem Relation Age of Onset     Cancer - colorectal Maternal Grandmother      DIABETES Maternal Grandfather      HEART DISEASE Father 64     onset        EXAM:Vitals: Ht 1.784 m (5' 10.25\")  Wt 116.6 kg (257 lb)  BMI 36.61 kg/m2  BMI= Body mass index is 36.61 kg/(m^2).    Weight management plan: Patient was referred to their PCP to discuss a diet and exercise plan.    General appearance: Patient is alert and fully cooperative with history & exam.  No sign of distress is noted during the visit.     Psychiatric: Affect is pleasant & appropriate.  Patient appears motivated to improve health.     Respiratory: Breathing is regular & unlabored while sitting.     HEENT: Hearing is intact to spoken word.  Speech is clear.  No gross evidence of visual impairment that would impact ambulation.     Dermatologic: Skin is intact to both lower extremities without significant lesions, rash or abrasion.  No paronychia or evidence of soft tissue infection is noted.     Vascular: DP & PT pulses are intact & regular bilaterally.  No significant edema or varicosities noted.  CFT and skin temperature is normal to both lower extremities.     Neurologic: Lower extremity sensation is intact to light touch.  No evidence of weakness or contracture in the lower extremities.  No evidence of neuropathy.     Musculoskeletal: Patient is ambulatory without assistive device or brace.  No gross ankle deformity noted.  No foot or ankle joint effusion is noted.  No pain on palpation over the extensor tendons on the left ankle. No surrounding erythema noted. No notes moderate edema.    Radiographs were evaluated including AP, lateral and medial oblique views " of the left foot reveals  no cortical erosions or periosteal elevation.  All joint margins appear stable.  There is no apparent fracture or tumor formation noted.  There is no evidence of foreign body.    ASSESSMENT / PLAN:     ICD-10-CM    1. Acute idiopathic gout of left ankle M10.072 methylPREDNISolone (MEDROL DOSEPAK) 4 MG tablet   2. Left ankle tendinitis M77.52 order for DME     methylPREDNISolone (MEDROL DOSEPAK) 4 MG tablet       I have explained to Jaime  about the conditions.  We discussed the nature of the condition as well as the treatment plan and expected length of recovery.  At this time, the patient was instructed on icing, stretching, tissue massage and support.  The patient was fitted with a Tri-Lock ankle brace that will aid in offloading the tension forces to the soft tissues and prevent further inflammation.  To reduce the amount of current inflammation, the patient was prescribed a Medrol Dosepak to be taken daily with food and instructed to stop taking if any stomach irritation or swelling in extremities are noted.  The patient will return in four weeks for reevaluation if the symptoms do not resolve.          Disclaimer: This note consists of symbols derived from keyboarding, dictation and/or voice recognition software. As a result, there may be errors in the script that have gone undetected. Please consider this when interpreting information found in this chart.       DIAZ Mcclellan D.P.M., F.SIA.C.F.A.S.

## 2017-09-14 ENCOUNTER — OFFICE VISIT (OUTPATIENT)
Dept: FAMILY MEDICINE | Facility: CLINIC | Age: 52
End: 2017-09-14
Payer: COMMERCIAL

## 2017-09-14 VITALS
HEART RATE: 82 BPM | SYSTOLIC BLOOD PRESSURE: 148 MMHG | DIASTOLIC BLOOD PRESSURE: 102 MMHG | RESPIRATION RATE: 16 BRPM | OXYGEN SATURATION: 97 % | BODY MASS INDEX: 36.61 KG/M2 | WEIGHT: 257 LBS

## 2017-09-14 DIAGNOSIS — M10.9 ACUTE GOUTY ARTHRITIS: Primary | ICD-10-CM

## 2017-09-14 PROCEDURE — 89060 EXAM SYNOVIAL FLUID CRYSTALS: CPT | Performed by: FAMILY MEDICINE

## 2017-09-14 PROCEDURE — 20605 DRAIN/INJ JOINT/BURSA W/O US: CPT | Mod: LT | Performed by: FAMILY MEDICINE

## 2017-09-14 PROCEDURE — 99214 OFFICE O/P EST MOD 30 MIN: CPT | Mod: 25 | Performed by: FAMILY MEDICINE

## 2017-09-14 RX ORDER — PREDNISONE 20 MG/1
TABLET ORAL
Qty: 13 TABLET | Refills: 0 | Status: SHIPPED | OUTPATIENT
Start: 2017-09-14 | End: 2018-02-28

## 2017-09-14 NOTE — NURSING NOTE
"Chief Complaint   Patient presents with     RECHECK       Initial BP (!) 148/102  Pulse 82  Resp 16  Wt 257 lb (116.6 kg)  SpO2 97%  BMI 36.61 kg/m2 Estimated body mass index is 36.61 kg/(m^2) as calculated from the following:    Height as of 9/6/17: 5' 10.25\" (1.784 m).    Weight as of this encounter: 257 lb (116.6 kg).  Medication Reconciliation: complete    Health Maintenance that is potentially due pending provider review:  Colonoscopy/FIT    Will discuss with provider.    Is there anyone who you would like to be able to receive your results? No  If yes have patient fill out FIORDALIZA      "

## 2017-09-14 NOTE — PROGRESS NOTES
SUBJECTIVE:   Jaime Griffiths is a 52 year old male who presents to clinic today for the following health issues:      Recheck Left ankle pain  Patient is having more pain, can not do his job to his best ability   Needs something done  Saw Podiatry and was told this could be gout  Is taking indocin, and has stopped using brace, it was uncomfortable.    S: .Jaime Griffiths is a 52 year old male with s uspected gout L ankle.  No acute injury, started day after doing a decent amount of drinking for fantasy football draft.    Has had this pain before.     Ankle  Tender both sides, achilles some too.  No fver, not ill.  Prednisone helped, but had to be on feet for work, started hurting again when off prednisone.     Uric acid high in past.  No allopurinol.     Problem list, med list, additional histories reviewed and updated, as indicated.      O;BP (!) 148/102  Pulse 82  Resp 16  Wt 257 lb (116.6 kg)  SpO2 97%  BMI 36.61 kg/m2  GEN: Alert and oriented, in no acute distress  Swollen L ankle, moderate.  Some warmth.  Bears wt OK, but limping.    With his permission, aspirated about 1cc serosanguinous fluid from ankle, anterior approach, medial to tendon.  Got into joint, needle inserted appropriate length.  Bleeding controlled    This was after numbing with some licocaine superficially and small amount into joint as well.       A: gout, ankle.  Trying to confirm on aspiration    P: will do crystal analysis.  Prednisone for 10 days.  Off work next 3  Days, rest.     Was hoping for more fluid, but we'll see if enough to identify anything.      Not thinking infection given history and course of events thusfar.    tt 25 min, more than 1/2 that time counseling about gout, diet, aspiration risks and potential benefits, prednisone, and follow up considerations.     Addendum: no crystals seen.  Called patient 3 days later, walking fine, much improved.  Gout still possible (likely?), maybe just didn't get good enough sample.   Will follow up as needed, he's not currently interested in allopurinol, but will consider if recurrent sx.

## 2017-09-15 LAB — CRYSTALS SNV MICRO: NORMAL

## 2018-02-28 ENCOUNTER — OFFICE VISIT (OUTPATIENT)
Dept: FAMILY MEDICINE | Facility: CLINIC | Age: 53
End: 2018-02-28
Payer: COMMERCIAL

## 2018-02-28 VITALS
BODY MASS INDEX: 36.85 KG/M2 | HEIGHT: 70 IN | SYSTOLIC BLOOD PRESSURE: 170 MMHG | TEMPERATURE: 98.6 F | OXYGEN SATURATION: 98 % | HEART RATE: 83 BPM | DIASTOLIC BLOOD PRESSURE: 100 MMHG | WEIGHT: 257.4 LBS

## 2018-02-28 DIAGNOSIS — M25.461 SWOLLEN R KNEE: Primary | ICD-10-CM

## 2018-02-28 DIAGNOSIS — M10.9 ACUTE GOUT OF RIGHT KNEE, UNSPECIFIED CAUSE: ICD-10-CM

## 2018-02-28 PROCEDURE — 99214 OFFICE O/P EST MOD 30 MIN: CPT | Performed by: FAMILY MEDICINE

## 2018-02-28 RX ORDER — PREDNISONE 20 MG/1
TABLET ORAL
Qty: 14 TABLET | Refills: 0 | Status: SHIPPED | OUTPATIENT
Start: 2018-02-28 | End: 2018-11-15

## 2018-02-28 NOTE — MR AVS SNAPSHOT
"              After Visit Summary   2/28/2018    Jamie Griffiths    MRN: [id number]           Patient Information     Date Of Birth          1965        Visit Information        Provider Department      2/28/2018 2:40 PM Rd Gayle MD [facility] [place]        Today's Diagnoses     Swollen R knee    -  1    Acute gout of right knee, unspecified cause           Follow-ups after your visit        Who to contact     If you have questions or need follow up information about today's clinic visit or your schedule please contact [facility] directly at [phone].  Normal or non-critical lab and imaging results will be communicated to you by E-Signhart, letter or phone within 4 business days after the clinic has received the results. If you do not hear from us within 7 days, please contact the clinic through [employer] or phone. If you have a critical or abnormal lab result, we will notify you by phone as soon as possible.  Submit refill requests through [employer] or call your pharmacy and they will forward the refill request to us. Please allow 3 business days for your refill to be completed.          Additional Information About Your Visit        MyChart Information     [employer] gives you secure access to your electronic health record. If you see a primary care provider, you can also send messages to your care team and make appointments. If you have questions, please call your primary care clinic.  If you do not have a primary care provider, please call [phone] and they will assist you.        Care EveryWhere ID     This is your Care EveryWhere ID. This could be used by other organizations to access your [place] medical records  [id number]        Your Vitals Were     Pulse Temperature Height Pulse Oximetry BMI (Body Mass Index)       83 98.6  F (37  C) (Tympanic) 5' 10.25\" (1.784 m) 98% 36.67 kg/m2        Blood Pressure from Last 3 Encounters:   02/28/18 (!) 170/100   09/14/17 (!) 148/102 "   09/05/17 (!) 148/92    Weight from Last 3 Encounters:   02/28/18 257 lb 6.4 oz (116.8 kg)   09/14/17 257 lb (116.6 kg)   09/06/17 257 lb (116.6 kg)              Today, you had the following     No orders found for display         Today's Medication Changes          These changes are accurate as of 2/28/18  3:26 PM.  If you have any questions, ask your nurse or doctor.               Start taking these medicines.        Dose/Directions    predniSONE 20 MG tablet   Commonly known as:  DELTASONE   Used for:  Swollen R knee   Started by:  Rd Gayle MD        2 tabs daily for 4 days, then one tab for 6 days then stop   Quantity:  14 tablet   Refills:  0            Where to get your medicines      These medications were sent to [facility] - [address] [address]     Phone:  [phone]     predniSONE 20 MG tablet                Primary Care Provider Office Phone # Fax #    Rd Gayle [phone]       [address] [place] [address]        Equal Access to Services     ADELINE MORA : Hadii alia ku hadasho Soomaali, waaxda luqadaha, qaybta kaalmada adejesseniayada, ivone martinez . So [facility] [phone].    ATENCIÓN: Si habla español, tiene a bruce disposición servicios gratuitos de asistencia lingüística. Llame al [phone].    We comply with applicable federal civil rights laws and [place] laws. We do not discriminate on the basis of race, color, national origin, age, disability, sex, sexual orientation, or gender identity.            Thank you!     Thank you for choosing [facility]  for your care. Our goal is always to provide you with excellent care. Hearing back from our patients is one way we can continue to improve our services. Please take a few minutes to complete the written survey that you may receive in the mail after your visit with us. Thank you!             Your Updated  Medication List - Protect others around you: Learn how to safely use, store and throw away your medicines at www.disposemymeds.org.          This list is accurate as of 2/28/18  3:26 PM.  Always use your most recent med list.                   Brand Name Dispense Instructions for use Diagnosis    predniSONE 20 MG tablet    DELTASONE    14 tablet    2 tabs daily for 4 days, then one tab for 6 days then stop    Swollen R knee

## 2018-02-28 NOTE — LETTER
[facility] [place] [facility] [place]  [address] [facility] [address]  Phone: [phone]      REPORT OF WORK ABILITY    NOTE TO EMPLOYEE: You must promptly provide a copy of this report to your  employer or worker's compensation insurer, and Qualified Rehabilitation Consultant.    Date: 2/28/2018                     Employee Name: Jaime Griffiths         YOB: 1965  Medical Record Number: [id number]   Soc.Sec.No: [id number]  Employer: None                Date of Injury: 2/24/18  Managed Care Organization / Insurance Company Name: UNKNOWN    Diagnosis: R knee effusion/pain  Work Related: unknown     MMI: NO   Permanent Partial Disability(PPD) likely: UNKNOWN    EMPLOYEE IS ABLE TO WORK: OFF Work until march 5th.  Then return. .         TREATMENT PLAN/NOTES:  Prednisone, .rest, time.      Rd Gayle MD

## 2018-02-28 NOTE — PROGRESS NOTES
"  SUBJECTIVE:   Jaime Griffiths is a 52 year old male who presents to clinic today for the following health issues:    Musculoskeletal problem/pain      Duration: 4 days     Description  Location: right knee pain     Intensity:  moderate    Accompanying signs and symptoms: radiation of pain to the back of right thigh     History  Previous similar problem: YES- 2 surgeries   Previous evaluation:  none    Precipitating or alleviating factors:  Trauma or overuse: YES- overuse   Aggravating factors include: standing, walking, climbing stairs, lifting, exercise and overuse  Therapies tried and outcome: acetaminophen and Ibuprofen, with little relief     Subjective: This is a 52-year-old male with 5 days of right knee pain and swelling.  The pain is located in front of the knee and under the knee.  There is no pain deep in the knee.  He is walking okay on it although it feels stiff.    Previous history of patellar surgery decades ago.  No real issue since.    He had struggled with some right ankle pain and swelling in the recent past was thought to be gout attempted to get sample with needle aspiration and was unable.  Improved over time.    No trauma, no twisting, no falls, he really does not have much explanation for the pain.  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    No chest pain or shortness of breath no fever no streaking or rash on the leg and no lower extremity edema    Problem list, med list, additional histories reviewed and updated, as indicated.       O;BP (!) 170/100  Pulse 83  Temp 98.6  F (37  C) (Tympanic)  Ht 5' 10.25\" (1.784 m)  Wt 257 lb 6.4 oz (116.8 kg)  SpO2 98%  BMI 36.67 kg/m2  GEN: Alert and oriented, in no acute distress  R knee with effusion,  Warm, not hot.  Not red.  Tender over base of patella, patellar tendon, insertion on tibia.  No joint line pain.  Walks with some stiffness, no pain with wt bearing, however.    A: R knee " effusion, gout?     P: Given the lack of trauma or twist and since pain location, and a history of ankle problems with no similar inciting event, I would favor gout flare.  He denies increased alcohol use    We will treat with prednisone, he will stop NSAIDs.  If getting worse more pain harder to bear weight or feeling ill will follow-up immediately.  He knows he will need aspiration and possible orthopedic intervention if infection becomes high on the differential.

## 2018-03-08 ENCOUNTER — RADIANT APPOINTMENT (OUTPATIENT)
Dept: GENERAL RADIOLOGY | Facility: CLINIC | Age: 53
End: 2018-03-08
Attending: FAMILY MEDICINE
Payer: OTHER MISCELLANEOUS

## 2018-03-08 ENCOUNTER — OFFICE VISIT (OUTPATIENT)
Dept: FAMILY MEDICINE | Facility: CLINIC | Age: 53
End: 2018-03-08
Payer: OTHER MISCELLANEOUS

## 2018-03-08 VITALS
RESPIRATION RATE: 18 BRPM | DIASTOLIC BLOOD PRESSURE: 84 MMHG | HEIGHT: 70 IN | OXYGEN SATURATION: 97 % | WEIGHT: 251 LBS | SYSTOLIC BLOOD PRESSURE: 166 MMHG | BODY MASS INDEX: 35.93 KG/M2 | HEART RATE: 88 BPM | TEMPERATURE: 99.1 F

## 2018-03-08 DIAGNOSIS — S50.01XA CONTUSION OF RIGHT ELBOW, INITIAL ENCOUNTER: ICD-10-CM

## 2018-03-08 DIAGNOSIS — M25.521 RIGHT ELBOW PAIN: ICD-10-CM

## 2018-03-08 DIAGNOSIS — Z02.6 ENCOUNTER RELATED TO WORKER'S COMPENSATION CLAIM: Primary | ICD-10-CM

## 2018-03-08 PROCEDURE — 73080 X-RAY EXAM OF ELBOW: CPT | Mod: RT

## 2018-03-08 PROCEDURE — 99213 OFFICE O/P EST LOW 20 MIN: CPT | Performed by: FAMILY MEDICINE

## 2018-03-08 ASSESSMENT — PAIN SCALES - GENERAL: PAINLEVEL: MODERATE PAIN (5)

## 2018-03-08 NOTE — PROGRESS NOTES
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  This happened at work.  He has had pain hurts to fully flex he can fully extend    Some swelling and redness on the end of the elbow.  No other joints are bothering him.  He did not hit his head.    Problem list histories and meds reviewed as appropriate    O:/84  Pulse 88  Temp 99.1  F (37.3  C) (Tympanic)  Resp 18  Ht 5' 9.75\" (1.772 m)  Wt 251 lb (113.9 kg)  SpO2 97%  BMI 36.27 kg/m2  GEN: Alert and oriented, in no acute distress  Some swelling R elbow.  Minor bursitis.  Can extend, hard to fully flex without pain    Xray: looked like avulsion on tip of olecranon, but had that on old xray too.  No fat pads on lateral.    A: elbow contusion    P: sling for comfort.  nsaids prn, ice, time.  F/u if not imrproving.    "

## 2018-03-08 NOTE — MR AVS SNAPSHOT
"              After Visit Summary   3/8/2018    Jaime Griffiths    MRN: [id number]           Patient Information     Date Of Birth          1965        Visit Information        Provider Department      3/8/2018 2:00 PM Rd Gayle MD [facility]        Today's Diagnoses     Encounter related to worker's compensation claim    -  1    Right elbow pain        Contusion of right elbow, initial encounter           Follow-ups after your visit        Who to contact     If you have questions or need follow up information about today's clinic visit or your schedule please contact [facility] directly at [phone].  Normal or non-critical lab and imaging results will be communicated to you by Class[employer], letter or phone within 4 business days after the clinic has received the results. If you do not hear from us within 7 days, please contact the clinic through [employer] or phone. If you have a critical or abnormal lab result, we will notify you by phone as soon as possible.  Submit refill requests through [employer] or call your pharmacy and they will forward the refill request to us. Please allow 3 business days for your refill to be completed.          Additional Information About Your Visit        MyChart Information     [employer] gives you secure access to your electronic health record. If you see a primary care provider, you can also send messages to your care team and make appointments. If you have questions, please call your primary care clinic.  If you do not have a primary care provider, please call [phone] and they will assist you.        Care EveryWhere ID     This is your Care EveryWhere ID. This could be used by other organizations to access your [place] medical records  [id number]        Your Vitals Were     Pulse Temperature Respirations Height Pulse Oximetry BMI (Body Mass Index)    88 99.1  F (37.3  C) (Tympanic) 18 5' 9.75\" (1.772 m) 97% 36.27 kg/m2       Blood Pressure " from Last 3 Encounters:   03/08/18 166/84   02/28/18 (!) 170/100   09/14/17 (!) 148/102    Weight from Last 3 Encounters:   03/08/18 251 lb (113.9 kg)   02/28/18 257 lb 6.4 oz (116.8 kg)   09/14/17 257 lb (116.6 kg)              Today, you had the following     No orders found for display       Primary Care Provider Office Phone # Fax #    Rd Gayle [phone]       [address] [facility] [address]        Equal Access to Services     [facility]: Hadii alia ayala hadasho Soblake, waaxda luqadaha, qaybta kaalmada adejesseniayada, ivone martinez . So [facility] [phone].    ATENCIÓN: Si habla español, tiene a bruce disposición servicios gratuitos de asistencia lingüística. LlSelect [facility] [place] [phone].    We comply with applicable federal civil rights laws and [place] laws. We do not discriminate on the basis of race, color, national origin, age, disability, sex, sexual orientation, or gender identity.            Thank you!     Thank you for choosing [facility]  for your care. Our goal is always to provide you with excellent care. Hearing back from our patients is one way we can continue to improve our services. Please take a few minutes to complete the written survey that you may receive in the mail after your visit with us. Thank you!             Your Updated Medication List - Protect others around you: Learn how to safely use, store and throw away your medicines at www.disposemymeds.org.          This list is accurate as of 3/8/18  2:36 PM.  Always use your most recent med list.                   Brand Name Dispense Instructions for use Diagnosis    predniSONE 20 MG tablet    DELTASONE    14 tablet    2 tabs daily for 4 days, then one tab for 6 days then stop    Swollen R knee

## 2018-03-08 NOTE — NURSING NOTE
"Chief Complaint   Patient presents with     Work Comp     Elbow Pain       Initial There were no vitals taken for this visit. Estimated body mass index is 36.67 kg/(m^2) as calculated from the following:    Height as of 2/28/18: 5' 10.25\" (1.784 m).    Weight as of 2/28/18: 257 lb 6.4 oz (116.8 kg).      Not addressed, work comp    Is there anyone who you would like to be able to receive your results? Not Applicable  If yes have patient fill out FIORDALIZA    "

## 2018-09-24 ENCOUNTER — TELEPHONE (OUTPATIENT)
Dept: FAMILY MEDICINE | Facility: CLINIC | Age: 53
End: 2018-09-24

## 2018-09-24 NOTE — TELEPHONE ENCOUNTER
Panel Management Review      Patient has the following on his problem list:     Hypertension   Last three blood pressure readings:  BP Readings from Last 3 Encounters:   03/08/18 166/84   02/28/18 (!) 170/100   09/14/17 (!) 148/102     Blood pressure: FAILED    HTN Guidelines:  Age 18-59 BP range:  Less than 140/90  Age 60-85 with Diabetes:  Less than 140/90  Age 60-85 without Diabetes:  less than 150/90      Composite cancer screening  Chart review shows that this patient is due/due soon for the following Colonoscopy  Summary:    Patient is due/failing the following:   BP CHECK and COLONOSCOPY    Action needed:   Patient needs office visit for BP check with the clinic RN. and Patient needs referral/order: Colonoscopy    Type of outreach:    Attempted to contact pt. Unavailable. Will try again at a later time.     Questions for provider review:    None                                                                                                                                    Keturah Gold MA  1:26 PM 9/24/2018       Chart routed to none .

## 2018-10-29 NOTE — TELEPHONE ENCOUNTER
Called and spoke with pt. Scheduled pt as physical (per pt) for 11/15/2018 with Dr. Gayle.    Keturah Gold MA  4:07 PM 10/29/2018

## 2018-11-15 ENCOUNTER — OFFICE VISIT (OUTPATIENT)
Dept: FAMILY MEDICINE | Facility: CLINIC | Age: 53
End: 2018-11-15
Payer: COMMERCIAL

## 2018-11-15 VITALS
BODY MASS INDEX: 36.48 KG/M2 | HEART RATE: 72 BPM | DIASTOLIC BLOOD PRESSURE: 86 MMHG | WEIGHT: 254.8 LBS | RESPIRATION RATE: 16 BRPM | TEMPERATURE: 97.9 F | SYSTOLIC BLOOD PRESSURE: 144 MMHG | HEIGHT: 70 IN

## 2018-11-15 DIAGNOSIS — I10 HYPERTENSION, UNSPECIFIED TYPE: ICD-10-CM

## 2018-11-15 DIAGNOSIS — D12.6 ADENOMATOUS POLYP OF COLON, UNSPECIFIED PART OF COLON: ICD-10-CM

## 2018-11-15 DIAGNOSIS — E78.5 HYPERLIPIDEMIA LDL GOAL <130: ICD-10-CM

## 2018-11-15 DIAGNOSIS — E66.01 MORBID OBESITY (H): ICD-10-CM

## 2018-11-15 DIAGNOSIS — Z12.11 SPECIAL SCREENING FOR MALIGNANT NEOPLASMS, COLON: Primary | ICD-10-CM

## 2018-11-15 DIAGNOSIS — R73.09 ELEVATED GLUCOSE: ICD-10-CM

## 2018-11-15 LAB
ANION GAP SERPL CALCULATED.3IONS-SCNC: 3 MMOL/L (ref 3–14)
BUN SERPL-MCNC: 14 MG/DL (ref 7–30)
CALCIUM SERPL-MCNC: 9.2 MG/DL (ref 8.5–10.1)
CHLORIDE SERPL-SCNC: 103 MMOL/L (ref 94–109)
CHOLEST SERPL-MCNC: 231 MG/DL
CO2 SERPL-SCNC: 31 MMOL/L (ref 20–32)
CREAT SERPL-MCNC: 0.96 MG/DL (ref 0.66–1.25)
GFR SERPL CREATININE-BSD FRML MDRD: 83 ML/MIN/1.7M2
GLUCOSE SERPL-MCNC: 100 MG/DL (ref 70–99)
HBA1C MFR BLD: 5.3 % (ref 0–5.6)
HDLC SERPL-MCNC: 45 MG/DL
LDLC SERPL CALC-MCNC: 156 MG/DL
NONHDLC SERPL-MCNC: 186 MG/DL
POTASSIUM SERPL-SCNC: 4.4 MMOL/L (ref 3.4–5.3)
SODIUM SERPL-SCNC: 137 MMOL/L (ref 133–144)
TRIGL SERPL-MCNC: 151 MG/DL

## 2018-11-15 PROCEDURE — 99213 OFFICE O/P EST LOW 20 MIN: CPT | Mod: 25 | Performed by: FAMILY MEDICINE

## 2018-11-15 PROCEDURE — 99396 PREV VISIT EST AGE 40-64: CPT | Performed by: FAMILY MEDICINE

## 2018-11-15 PROCEDURE — 80061 LIPID PANEL: CPT | Performed by: FAMILY MEDICINE

## 2018-11-15 PROCEDURE — 83036 HEMOGLOBIN GLYCOSYLATED A1C: CPT | Performed by: FAMILY MEDICINE

## 2018-11-15 PROCEDURE — 80048 BASIC METABOLIC PNL TOTAL CA: CPT | Performed by: FAMILY MEDICINE

## 2018-11-15 PROCEDURE — 36415 COLL VENOUS BLD VENIPUNCTURE: CPT | Performed by: FAMILY MEDICINE

## 2018-11-15 RX ORDER — LISINOPRIL 20 MG/1
20 TABLET ORAL DAILY
Qty: 90 TABLET | Refills: 3 | Status: SHIPPED | OUTPATIENT
Start: 2018-11-15 | End: 2019-03-21

## 2018-11-15 ASSESSMENT — ENCOUNTER SYMPTOMS
HEMATURIA: 0
PARESTHESIAS: 0
DYSURIA: 0
CHILLS: 0
PALPITATIONS: 0
WEAKNESS: 0
NERVOUS/ANXIOUS: 0
CONSTIPATION: 0
SHORTNESS OF BREATH: 0
JOINT SWELLING: 0
HEADACHES: 0
MYALGIAS: 0
ABDOMINAL PAIN: 0
COUGH: 0
FEVER: 0
DIZZINESS: 0
SORE THROAT: 0
EYE PAIN: 0
FREQUENCY: 0
HEARTBURN: 0
HEMATOCHEZIA: 0
DIARRHEA: 0
ARTHRALGIAS: 0
NAUSEA: 0

## 2018-11-15 ASSESSMENT — PAIN SCALES - GENERAL: PAINLEVEL: NO PAIN (0)

## 2018-11-15 NOTE — MR AVS SNAPSHOT
After Visit Summary   11/15/2018    Jaime Griffiths    MRN: [id number]           Patient Information     Date Of Birth          1965        Visit Information        Provider Department      11/15/2018 9:00 AM Rd Gayle MD [facility]        Today's Diagnoses     Special screening for malignant neoplasms, colon    -  1    Adenomatous polyp of colon, unspecified part of colon        Morbid obesity (H)        Hypertension, unspecified type        Elevated glucose        Hyperlipidemia LDL goal <130          Care Instructions      Preventive Health Recommendations  Male Ages 50 - 64    Yearly exam:             See your health care provider every year in order to  o   Review health changes.   o   Discuss preventive care.    o   Review your medicines if your doctor has prescribed any.     Have a cholesterol test every 5 years, or more frequently if you are at risk for high cholesterol/heart disease.     Have a diabetes test (fasting glucose) every three years. If you are at risk for diabetes, you should have this test more often.     Have a colonoscopy at age 50, or have a yearly FIT test (stool test). These exams will check for colon cancer.      Talk with your health care provider about whether or not a prostate cancer screening test (PSA) is right for you.    You should be tested each year for STDs (sexually transmitted diseases), if you re at risk.     Shots: Get a flu shot each year. Get a tetanus shot every 10 years.     Nutrition:    Eat at least 5 servings of fruits and vegetables daily.     Eat whole-grain bread, whole-wheat pasta and brown rice instead of white grains and rice.     Get adequate Calcium and Vitamin D.     Lifestyle    Exercise for at least 150 minutes a week (30 minutes a day, 5 days a week). This will help you control your weight and prevent disease.     Limit alcohol to one drink per day.     No smoking.     Wear sunscreen to prevent skin cancer.     See  your dentist every six months for an exam and cleaning.     See your eye doctor every 1 to 2 years.            Follow-ups after your visit        Additional Services     GASTROENTEROLOGY ADULT REF PROCEDURE ONLY [facility] [phone]; No Provider Preference       Last Lab Result: No results found for: CR  Body mass index is 36.82 kg/(m^2).     Needed:  No  Language:  English    Patient will be contacted to schedule procedure.     Please be aware that coverage of these services is subject to the terms and limitations of your health insurance plan.  Call member services at your health plan with any benefit or coverage questions.  Any procedures must be performed at a [place] facility OR coordinated by your clinic's referral office.    Please bring the following with you to your appointment:    (1) Any X-Rays, CTs or MRIs which have been performed.  Contact the facility where they were done to arrange for  prior to your scheduled appointment.    (2) List of current medications   (3) This referral request   (4) Any documents/labs given to you for this referral                  Follow-up notes from your care team     Return in about 4 weeks (around 12/13/2018) for Routine Visit.      Who to contact     If you have questions or need follow up information about today's clinic visit or your schedule please contact [facility] directly at [phone].  Normal or non-critical lab and imaging results will be communicated to you by MyChart, letter or phone within 4 business days after the clinic has received the results. If you do not hear from us within 7 days, please contact the clinic through MyChart or phone. If you have a critical or abnormal lab result, we will notify you by phone as soon as possible.  Submit refill requests through [employer] or call your pharmacy and they will forward the refill request to us. Please allow 3 business days for your refill to be completed.        "   Additional Information About Your Visit        MyChart Information     [employer] gives you secure access to your electronic health record. If you see a primary care provider, you can also send messages to your care team and make appointments. If you have questions, please call your primary care clinic.  If you do not have a primary care provider, please call [phone] and they will assist you.        Care EveryWhere ID     This is your Care EveryWhere ID. This could be used by other organizations to access your [place] medical records  [id number]        Your Vitals Were     Pulse Temperature Respirations Height BMI (Body Mass Index)       72 97.9  F (36.6  C) (Tympanic) 16 5' 9.75\" (1.772 m) 36.82 kg/m2        Blood Pressure from Last 3 Encounters:   11/15/18 144/86   03/08/18 166/84   02/28/18 (!) 170/100    Weight from Last 3 Encounters:   11/15/18 254 lb 12.8 oz (115.6 kg)   03/08/18 251 lb (113.9 kg)   02/28/18 257 lb 6.4 oz (116.8 kg)              We Performed the Following     Basic metabolic panel  (Ca, Cl, CO2, Creat, Gluc, K, Na, BUN)     GASTROENTEROLOGY ADULT REF PROCEDURE ONLY [facility] [phone]; No Provider Preference     Hemoglobin A1c     Lipid panel reflex to direct LDL Fasting     OFFICE/OUTPT VISIT,EST,LEVL III          Today's Medication Changes          These changes are accurate as of 11/15/18  9:42 AM.  If you have any questions, ask your nurse or doctor.               Start taking these medicines.        Dose/Directions    lisinopril 20 MG tablet   Commonly known as:  PRINIVIL/ZESTRIL   Used for:  Hypertension, unspecified type   Started by:  dR Gayle MD        Dose:  20 mg   Take 1 tablet (20 mg) by mouth daily   Quantity:  90 tablet   Refills:  3            Where to get your medicines      These medications were sent to [facility] [address] [facility] [address]     Phone:  [phone]     lisinopril 20 MG " tablet                Primary Care Provider Office Phone # Fax #    Rd Gayle [phone]       [address] [place] [phone]        Equal Access to Services     TRIPADELINE ABBY : Hadbrenden alia ayala eleo Soblake, waaxda luqadaha, qaybta kaalmada jeb, ivone ramirezemma emerald. So [facility] [phone].    ATENCIÓN: Si habla español, tiene a bruce disposición servicios gratuitos de asistencia lingüística. Llame al [phone].    We comply with applicable federal civil rights laws and [place] laws. We do not discriminate on the basis of race, color, national origin, age, disability, sex, sexual orientation, or gender identity.            Thank you!     Thank you for choosing [facility]  for your care. Our goal is always to provide you with excellent care. Hearing back from our patients is one way we can continue to improve our services. Please take a few minutes to complete the written survey that you may receive in the mail after your visit with us. Thank you!             Your Updated Medication List - Protect others around you: Learn how to safely use, store and throw away your medicines at www.disposemymeds.org.          This list is accurate as of 11/15/18  9:42 AM.  Always use your most recent med list.                   Brand Name Dispense Instructions for use Diagnosis    lisinopril 20 MG tablet    PRINIVIL/ZESTRIL    90 tablet    Take 1 tablet (20 mg) by mouth daily    Hypertension, unspecified type

## 2018-11-15 NOTE — PROGRESS NOTES
SUBJECTIVE:   CC: Jaime Griffiths is an 53 year old male who presents for preventative health visit.     Physical   Annual:     Getting at least 3 servings of Calcium per day:  Yes    Bi-annual eye exam:  NO    Dental care twice a year:  NO    Sleep apnea or symptoms of sleep apnea:  None    Diet:  Regular (no restrictions)    Frequency of exercise:  1 day/week    Duration of exercise:  15-30 minutes    Taking medications regularly:  Yes    Medication side effects:  None    Additional concerns today:  Yes    1.) Look at mole and skin spots on face. Has some seb Ks.  Wanted me to look at them    Elevated BP.  Needs meds.  It's  Been several years now with high values.  Willing to start  No cp or sob.  Should have some labs updated.     Elevated glucose: needs recheck.    DM2 something that could be on horizon    Morbid obesity: 36 BMI with htn.  Needs to make some changes.    Due for colonoscopy as well.      Today's PHQ-2 Score:   PHQ-2 ( 1999 Pfizer) 11/15/2018   Q1: Little interest or pleasure in doing things 0   Q2: Feeling down, depressed or hopeless 0   PHQ-2 Score 0   Q1: Little interest or pleasure in doing things Not at all   Q2: Feeling down, depressed or hopeless Not at all   PHQ-2 Score 0       Abuse: Current or Past(Physical, Sexual or Emotional)- No  Do you feel safe in your environment - Yes    Social History   Substance Use Topics     Smoking status: Former Smoker     Quit date: 10/4/2014     Smokeless tobacco: Current User     Types: Chew     Alcohol use 12.0 oz/week     24 Cans of beer per week      Comment: 15 beers per week     Alcohol Use 11/15/2018   If you drink alcohol do you typically have greater than 3 drinks per day OR greater than 7 drinks per week? No       Last PSA: No results found for: PSA    Reviewed orders with patient. Reviewed health maintenance and updated orders accordingly - Yes      Reviewed and updated as needed this visit by clinical staff         Reviewed and updated as needed  "this visit by Provider            Review of Systems   Constitutional: Negative for chills and fever.   HENT: Negative for congestion, ear pain, hearing loss and sore throat.    Eyes: Negative for pain and visual disturbance.   Respiratory: Negative for cough and shortness of breath.    Cardiovascular: Negative for chest pain, palpitations and peripheral edema.   Gastrointestinal: Negative for abdominal pain, constipation, diarrhea, heartburn, hematochezia and nausea.   Genitourinary: Negative for discharge, dysuria, frequency, genital sores, hematuria, impotence and urgency.   Musculoskeletal: Negative for arthralgias, joint swelling and myalgias.   Skin: Negative for rash.   Neurological: Negative for dizziness, weakness, headaches and paresthesias.   Psychiatric/Behavioral: Negative for mood changes. The patient is not nervous/anxious.        OBJECTIVE:   /86 (BP Location: Right arm, Patient Position: Chair, Cuff Size: Adult Large)  Pulse 72  Temp 97.9  F (36.6  C) (Tympanic)  Resp 16  Ht 5' 9.75\" (1.772 m)  Wt 254 lb 12.8 oz (115.6 kg)  BMI 36.82 kg/m2    Physical Exam  Gen: alert and oriented, in no acute distress, affect within normal limits  Neck: supple with no masses or nodes  Throat: oropharynx clear, no exudate or tonsillar/palate asymmetry.    CV: RRR, no murmur  Lungs: clear bilaterally with good effort  Abd: nontender, no mass  Ext: no edema or lesions   Neuro: moving all extremities, gait normal, no focal deficts noted  Has seb K in hair, one near nose     ASSESSMENT/PLAN:   Well exam  Htn, new  Morbid obesity, not controlled   Elevated glucose, recheck    Lisinopril. 20mg.   Colonoscopy.  Labs.  Work on wt loss, diet changes, should be cutting back on beer some too.     Back in  A month.   Refused flu shot.      COUNSELING:   Reviewed preventive health counseling, as reflected in patient instructions       Regular exercise       Healthy diet/nutrition    BP Readings from Last 1 Encounters: " "  03/08/18 166/84     Estimated body mass index is 36.27 kg/(m^2) as calculated from the following:    Height as of 3/8/18: 5' 9.75\" (1.772 m).    Weight as of 3/8/18: 251 lb (113.9 kg).      Weight management plan: diet/exercise     reports that he quit smoking about 4 years ago. His smokeless tobacco use includes Chew.          Rd Gayle MD  [facility]  "

## 2018-11-15 NOTE — NURSING NOTE
"Chief Complaint   Patient presents with     Physical       Initial /86 (BP Location: Right arm, Patient Position: Chair, Cuff Size: Adult Large)  Pulse 72  Temp 97.9  F (36.6  C) (Tympanic)  Resp 16  Ht 5' 9.75\" (1.772 m)  Wt 254 lb 12.8 oz (115.6 kg)  BMI 36.82 kg/m2 Estimated body mass index is 36.82 kg/(m^2) as calculated from the following:    Height as of this encounter: 5' 9.75\" (1.772 m).    Weight as of this encounter: 254 lb 12.8 oz (115.6 kg).    Patient presents to the clinic using No DME    Health Maintenance that is potentially due pending provider review:  BP was high, used pink card, recheck manually    Keturah Gold MA  9:01 AM 11/15/2018  .      "

## 2018-12-12 ENCOUNTER — OFFICE VISIT (OUTPATIENT)
Dept: FAMILY MEDICINE | Facility: CLINIC | Age: 53
End: 2018-12-12
Payer: COMMERCIAL

## 2018-12-12 VITALS
TEMPERATURE: 98.4 F | WEIGHT: 255 LBS | BODY MASS INDEX: 36.51 KG/M2 | HEART RATE: 70 BPM | DIASTOLIC BLOOD PRESSURE: 76 MMHG | SYSTOLIC BLOOD PRESSURE: 126 MMHG | HEIGHT: 70 IN | RESPIRATION RATE: 16 BRPM

## 2018-12-12 DIAGNOSIS — I10 HYPERTENSION, UNSPECIFIED TYPE: Primary | ICD-10-CM

## 2018-12-12 LAB
ANION GAP SERPL CALCULATED.3IONS-SCNC: 5 MMOL/L (ref 3–14)
BUN SERPL-MCNC: 17 MG/DL (ref 7–30)
CALCIUM SERPL-MCNC: 9.3 MG/DL (ref 8.5–10.1)
CHLORIDE SERPL-SCNC: 103 MMOL/L (ref 94–109)
CO2 SERPL-SCNC: 29 MMOL/L (ref 20–32)
CREAT SERPL-MCNC: 0.98 MG/DL (ref 0.66–1.25)
GFR SERPL CREATININE-BSD FRML MDRD: 81 ML/MIN/1.7M2
GLUCOSE SERPL-MCNC: 100 MG/DL (ref 70–99)
POTASSIUM SERPL-SCNC: 4.4 MMOL/L (ref 3.4–5.3)
SODIUM SERPL-SCNC: 137 MMOL/L (ref 133–144)

## 2018-12-12 PROCEDURE — 80048 BASIC METABOLIC PNL TOTAL CA: CPT | Performed by: FAMILY MEDICINE

## 2018-12-12 PROCEDURE — 99213 OFFICE O/P EST LOW 20 MIN: CPT | Performed by: FAMILY MEDICINE

## 2018-12-12 PROCEDURE — 36415 COLL VENOUS BLD VENIPUNCTURE: CPT | Performed by: FAMILY MEDICINE

## 2018-12-12 ASSESSMENT — MIFFLIN-ST. JEOR: SCORE: 2016.89

## 2018-12-12 ASSESSMENT — PAIN SCALES - GENERAL: PAINLEVEL: NO PAIN (0)

## 2018-12-12 NOTE — NURSING NOTE
"Chief Complaint   Patient presents with     Hypertension       Initial /76   Pulse 70   Temp 98.4  F (36.9  C) (Tympanic)   Resp 16   Ht 1.784 m (5' 10.25\")   Wt 115.7 kg (255 lb)   BMI 36.33 kg/m   Estimated body mass index is 36.33 kg/m  as calculated from the following:    Height as of this encounter: 1.784 m (5' 10.25\").    Weight as of this encounter: 115.7 kg (255 lb).    Patient presents to the clinic using     Health Maintenance that is potentially due pending provider review:  Colonoscopy is scheduling        Is there anyone who you would like to be able to receive your results?   If yes have patient fill out [facility]    "

## 2018-12-12 NOTE — PROGRESS NOTES
"  SUBJECTIVE:   Jaime Griffiths is a 53 year old male who presents to clinic today for the following health issues:      Hypertension Follow-up      Outpatient blood pressures are being checked at home.  Results are 133/72.    Low Salt Diet: low salt      Amount of exercise or physical activity: walking with dogs    Problems taking medications regularly: No    Medication side effects: ?cough    Diet:         s :Jaime Griffiths is a 53 year old male with htn.  Now controlled.  Slight cough on Lisinopril, but still wants to stay on the med for now.     Problem list, med list, additional histories reviewed and updated, as indicated.      No cp or sob    O:/76   Pulse 70   Temp 98.4  F (36.9  C) (Tympanic)   Resp 16   Ht 1.784 m (5' 10.25\")   Wt 115.7 kg (255 lb)   BMI 36.33 kg/m    GEN: Alert and oriented, in no acute distress  CV: RRR, no murmur  EXT: no edema or lesions noted in lower extremities    A: htn, now controlled    P: continue lisinopril for now.  Check met panel.  If cough starts to bother more, will switch to 50mg losartan, he will let us know if we need to do that.    F/u one year otherwise    .  "

## 2019-02-14 ENCOUNTER — ALLIED HEALTH/NURSE VISIT (OUTPATIENT)
Dept: FAMILY MEDICINE | Facility: CLINIC | Age: 54
End: 2019-02-14
Payer: COMMERCIAL

## 2019-02-14 DIAGNOSIS — Z23 NEED FOR VACCINATION: Primary | ICD-10-CM

## 2019-02-14 PROCEDURE — 99207 ZZC NO CHARGE NURSE ONLY: CPT

## 2019-02-14 PROCEDURE — 90715 TDAP VACCINE 7 YRS/> IM: CPT

## 2019-02-14 PROCEDURE — 90471 IMMUNIZATION ADMIN: CPT

## 2019-03-19 ENCOUNTER — TELEPHONE (OUTPATIENT)
Dept: FAMILY MEDICINE | Facility: CLINIC | Age: 54
End: 2019-03-19

## 2019-03-19 DIAGNOSIS — I10 HYPERTENSION, UNSPECIFIED TYPE: Primary | ICD-10-CM

## 2019-03-19 NOTE — TELEPHONE ENCOUNTER
C/O of dry, hacky, cough since starting the lisinopril 3 months ago.  Cough started a few weeks and feels like something stuck in throat.  He is aware that Dr Gayle is out of the office today and this is OK to wait  Clemencia Diallo RN

## 2019-03-19 NOTE — TELEPHONE ENCOUNTER
Reason for Call:  Other     Detailed comments: Please call pt - He has a dry cough from the lisinopril and would like to change medications.    Phone Number Patient can be reached at: Home number on file [phone] (home)    Best Time:     Can we leave a detailed message on this number? YES    Call taken on 3/19/2019 at 11:53 AM by Padmini Fitzpatrick

## 2019-03-21 RX ORDER — LOSARTAN POTASSIUM 50 MG/1
50 TABLET ORAL DAILY
Qty: 90 TABLET | Refills: 3 | Status: SHIPPED | OUTPATIENT
Start: 2019-03-21 | End: 2020-04-28

## 2020-02-08 ENCOUNTER — HEALTH MAINTENANCE LETTER (OUTPATIENT)
Age: 55
End: 2020-02-08

## 2020-04-28 DIAGNOSIS — I10 HYPERTENSION, UNSPECIFIED TYPE: ICD-10-CM

## 2020-04-28 RX ORDER — LOSARTAN POTASSIUM 50 MG/1
TABLET ORAL
Qty: 30 TABLET | Refills: 0 | Status: SHIPPED | OUTPATIENT
Start: 2020-04-28 | End: 2020-05-06

## 2020-04-28 NOTE — TELEPHONE ENCOUNTER
Last seen 12/12/18.    He will make an appointment to be seen.    BP at home usually 130's/70's  Clemencia Diallo RN

## 2020-05-06 ENCOUNTER — VIRTUAL VISIT (OUTPATIENT)
Dept: FAMILY MEDICINE | Facility: CLINIC | Age: 55
End: 2020-05-06
Payer: COMMERCIAL

## 2020-05-06 DIAGNOSIS — I10 HYPERTENSION, UNSPECIFIED TYPE: ICD-10-CM

## 2020-05-06 PROCEDURE — 99213 OFFICE O/P EST LOW 20 MIN: CPT | Mod: GT | Performed by: FAMILY MEDICINE

## 2020-05-06 RX ORDER — LOSARTAN POTASSIUM 50 MG/1
50 TABLET ORAL DAILY
Qty: 90 TABLET | Refills: 3 | Status: SHIPPED | OUTPATIENT
Start: 2020-05-06 | End: 2021-06-23

## 2020-05-06 NOTE — PROGRESS NOTES
"Jaime Griffiths is a 54 year old male who is being evaluated via a billable video visit.      The patient has been notified of following:     \"This video visit will be conducted via a call between you and your physician/provider. We have found that certain health care needs can be provided without the need for an in-person physical exam.  This service lets us provide the care you need with a video conversation.  If a prescription is necessary we can send it directly to your pharmacy.  If lab work is needed we can place an order for that and you can then stop by our lab to have the test done at a later time.    Video visits are billed at different rates depending on your insurance coverage.  Please reach out to your insurance provider with any questions.    If during the course of the call the physician/provider feels a video visit is not appropriate, you will not be charged for this service.\"    Patient has given verbal consent for Video visit? Yes    How would you like to obtain your AVS? [facility]    Patient would like the video invitation sent by: Text to cell phone: [phone]    Will anyone else be joining your video visit? No      Subjective     Jaime Griffiths is a 54 year old male who presents to clinic today for the following health issues:    HPI  Hypertension Follow-up      Do you check your blood pressure regularly outside of the clinic? Yes ,within goal 134/62    Are you following a low salt diet? Yes    Are your blood pressures ever more than 140 on the top number (systolic) OR more   than 90 on the bottom number (diastolic), for example 140/90? No      How many servings of fruits and vegetables do you eat daily?  2-3    On average, how many sweetened beverages do you drink each day (Examples: soda, juice, sweet tea, etc.  Do NOT count diet or artificially sweetened beverages)?   0    How many days per week do you exercise enough to make your heart beat faster? 7    How many minutes a day do you exercise " enough to make your heart beat faster? 20 - 29    How many days per week do you miss taking your medication? 0         Video Start Time: 4:08        S: Jaime Griffiths is a 54 year old male with htn.  Needs fills and labs.  Losartan 50.  Controlled    No cp or sob    Problem list, med list, additional histories reviewed and updated, as indicated.      O:There were no vitals taken for this visit.  GEN: Alert and oriented, in no acute distress during visit on screen    A: htn, controlled    P; fills.  Labs.  Doing well.       Platform used for Video Visit: Doximity     Done at 4:11    No follow-ups on file.

## 2020-05-12 DIAGNOSIS — I10 HYPERTENSION, UNSPECIFIED TYPE: ICD-10-CM

## 2020-05-12 LAB
ANION GAP SERPL CALCULATED.3IONS-SCNC: 1 MMOL/L (ref 3–14)
BUN SERPL-MCNC: 16 MG/DL (ref 7–30)
CALCIUM SERPL-MCNC: 9.1 MG/DL (ref 8.5–10.1)
CHLORIDE SERPL-SCNC: 108 MMOL/L (ref 94–109)
CO2 SERPL-SCNC: 30 MMOL/L (ref 20–32)
CREAT SERPL-MCNC: 1.01 MG/DL (ref 0.66–1.25)
GFR SERPL CREATININE-BSD FRML MDRD: 84 ML/MIN/{1.73_M2}
GLUCOSE SERPL-MCNC: 96 MG/DL (ref 70–99)
POTASSIUM SERPL-SCNC: 4.5 MMOL/L (ref 3.4–5.3)
SODIUM SERPL-SCNC: 139 MMOL/L (ref 133–144)

## 2020-05-12 PROCEDURE — 80048 BASIC METABOLIC PNL TOTAL CA: CPT | Performed by: FAMILY MEDICINE

## 2020-05-12 PROCEDURE — 36415 COLL VENOUS BLD VENIPUNCTURE: CPT | Performed by: FAMILY MEDICINE

## 2020-11-08 ENCOUNTER — HEALTH MAINTENANCE LETTER (OUTPATIENT)
Age: 55
End: 2020-11-08

## 2021-03-28 ENCOUNTER — HEALTH MAINTENANCE LETTER (OUTPATIENT)
Age: 56
End: 2021-03-28

## 2021-05-12 ENCOUNTER — OFFICE VISIT (OUTPATIENT)
Dept: FAMILY MEDICINE | Facility: CLINIC | Age: 56
End: 2021-05-12
Payer: COMMERCIAL

## 2021-05-12 VITALS
DIASTOLIC BLOOD PRESSURE: 84 MMHG | WEIGHT: 261.8 LBS | SYSTOLIC BLOOD PRESSURE: 130 MMHG | HEIGHT: 70 IN | BODY MASS INDEX: 37.48 KG/M2 | RESPIRATION RATE: 20 BRPM | TEMPERATURE: 97.7 F | HEART RATE: 74 BPM

## 2021-05-12 DIAGNOSIS — N45.1 EPIDIDYMITIS, RIGHT: Primary | ICD-10-CM

## 2021-05-12 PROBLEM — F10.20 ALCOHOL DEPENDENCE (H): Status: ACTIVE | Noted: 2021-05-12

## 2021-05-12 PROCEDURE — 99213 OFFICE O/P EST LOW 20 MIN: CPT | Performed by: FAMILY MEDICINE

## 2021-05-12 ASSESSMENT — MIFFLIN-ST. JEOR: SCORE: 2029.8

## 2021-05-12 ASSESSMENT — PAIN SCALES - GENERAL: PAINLEVEL: NO PAIN (1)

## 2021-05-12 NOTE — PROGRESS NOTES
"A: epididymitis, R    P: nsaids, time.  If not improving or worsening, will f/u.          S: Jaime Griffiths is a 55 year old male with lump R testicle.  A bit tender.  Noticed yesterday, unsure how long it's been there    No urine changes or rash or other genital sx of concern    O:/84 (BP Location: Right arm, Patient Position: Sitting, Cuff Size: Adult Large)   Pulse 74   Temp 97.7  F (36.5  C) (Tympanic)   Resp 20   Ht 1.772 m (5' 9.75\")   Wt 118.8 kg (261 lb 12.8 oz)   BMI 37.83 kg/m    GEN: Alert and oriented, in no acute distress  L testicle normal  R testicle with 5mm rubbery cyst epididymis.  Mildly tender        "

## 2021-06-23 ENCOUNTER — OFFICE VISIT (OUTPATIENT)
Dept: FAMILY MEDICINE | Facility: CLINIC | Age: 56
End: 2021-06-23
Payer: COMMERCIAL

## 2021-06-23 VITALS
WEIGHT: 258 LBS | OXYGEN SATURATION: 98 % | HEIGHT: 70 IN | HEART RATE: 66 BPM | SYSTOLIC BLOOD PRESSURE: 136 MMHG | BODY MASS INDEX: 36.94 KG/M2 | RESPIRATION RATE: 16 BRPM | DIASTOLIC BLOOD PRESSURE: 80 MMHG | TEMPERATURE: 98.5 F

## 2021-06-23 DIAGNOSIS — I10 HYPERTENSION, UNSPECIFIED TYPE: Primary | ICD-10-CM

## 2021-06-23 DIAGNOSIS — Z12.5 SCREENING FOR PROSTATE CANCER: ICD-10-CM

## 2021-06-23 DIAGNOSIS — E66.01 MORBID OBESITY (H): ICD-10-CM

## 2021-06-23 DIAGNOSIS — Z12.11 COLON CANCER SCREENING: ICD-10-CM

## 2021-06-23 DIAGNOSIS — R73.09 ELEVATED GLUCOSE: ICD-10-CM

## 2021-06-23 LAB
ANION GAP SERPL CALCULATED.3IONS-SCNC: 6 MMOL/L (ref 3–14)
BUN SERPL-MCNC: 19 MG/DL (ref 7–30)
CALCIUM SERPL-MCNC: 9.2 MG/DL (ref 8.5–10.1)
CHLORIDE SERPL-SCNC: 104 MMOL/L (ref 94–109)
CO2 SERPL-SCNC: 29 MMOL/L (ref 20–32)
CREAT SERPL-MCNC: 1.07 MG/DL (ref 0.66–1.25)
GFR SERPL CREATININE-BSD FRML MDRD: 78 ML/MIN/{1.73_M2}
GLUCOSE SERPL-MCNC: 102 MG/DL (ref 70–99)
POTASSIUM SERPL-SCNC: 4.5 MMOL/L (ref 3.4–5.3)
SODIUM SERPL-SCNC: 139 MMOL/L (ref 133–144)

## 2021-06-23 PROCEDURE — 99214 OFFICE O/P EST MOD 30 MIN: CPT | Performed by: FAMILY MEDICINE

## 2021-06-23 PROCEDURE — 80048 BASIC METABOLIC PNL TOTAL CA: CPT | Performed by: FAMILY MEDICINE

## 2021-06-23 PROCEDURE — 36415 COLL VENOUS BLD VENIPUNCTURE: CPT | Performed by: FAMILY MEDICINE

## 2021-06-23 RX ORDER — LOSARTAN POTASSIUM 50 MG/1
50 TABLET ORAL DAILY
Qty: 90 TABLET | Refills: 3 | Status: SHIPPED | OUTPATIENT
Start: 2021-06-23 | End: 2022-06-28

## 2021-06-23 ASSESSMENT — MIFFLIN-ST. JEOR: SCORE: 2012.56

## 2021-06-23 NOTE — PROGRESS NOTES
"A: htn, controlled      Screening for prostate cancer, discussed       Colon polyps, recheck colonoscopy        Morbid obesity, ongoing  Elevated glucose, recheck    P: fills.  Labs.  Work on wt loss.  Long discussion on screening.  Willing to do colon, not interested now in prostate.  See below    Weight management plan: diet/exercise           S: Jaime Griffiths is a 55 year old male with htn.  Fills and labs    Morbid obesity: ongoing.  htn as comorbidity  37 BMI    Elevated glucose: recheck    Colon adenoma: overdue on colonoscopy.  Knows it's important, willing to get     O;/80   Pulse 66   Temp 98.5  F (36.9  C) (Tympanic)   Resp 16   Ht 1.772 m (5' 9.75\")   Wt 117 kg (258 lb)   SpO2 98%   BMI 37.28 kg/m    GEN: Alert and oriented, in no acute distress  CV: RRR, no murmur  EXT: no edema or lesions noted in lower extremities      Discussed risks and benefits of PSA screening with the patient, attempting to engage in shared decision making.  Discussed the high likelihood of further invasive evaluation if the PSA is elevated.  Discussed inability to reliably distinguish aggressive from indolent cancer on biopsy. Explained that PSA screeing and aggressive treatment for biopsy confirmed cancer may extend life in those patients harboring an aggressive form of prostate cancer, but will overtreat the overwhelming majority of men because of the high rate of indolent cancer.  We discussed that treatment of prostate cancer is not benign, and that significant side effects are a real possibility.  We discussed current USPSTF recommendations against routine screening for prostate cancer.                 Given the above information, the patient elected not to procede with PSA screeing at this time.  He understands he can revisit the discussion at any time.     "

## 2021-09-11 ENCOUNTER — HEALTH MAINTENANCE LETTER (OUTPATIENT)
Age: 56
End: 2021-09-11

## 2022-04-23 ENCOUNTER — HEALTH MAINTENANCE LETTER (OUTPATIENT)
Age: 57
End: 2022-04-23

## 2022-06-27 DIAGNOSIS — I10 HYPERTENSION, UNSPECIFIED TYPE: ICD-10-CM

## 2022-06-28 RX ORDER — LOSARTAN POTASSIUM 50 MG/1
50 TABLET ORAL DAILY
Qty: 90 TABLET | Refills: 0 | Status: SHIPPED | OUTPATIENT
Start: 2022-06-28 | End: 2022-10-03

## 2022-10-03 DIAGNOSIS — I10 HYPERTENSION, UNSPECIFIED TYPE: ICD-10-CM

## 2022-10-03 RX ORDER — LOSARTAN POTASSIUM 50 MG/1
50 TABLET ORAL DAILY
Qty: 30 TABLET | Refills: 0 | Status: SHIPPED | OUTPATIENT
Start: 2022-10-03 | End: 2022-10-24

## 2022-10-03 NOTE — TELEPHONE ENCOUNTER
Patient calling requesting a refill of losartan. He has 5 tabs left. He did schedule an appt for next available 10/24/22 annual physical.   Last Written Prescription Date:  06/08/22  Last Fill Quantity: 90,  # refills: 0   Last office visit: 6/23/2021 with prescribing provider:     Future Office Visit:   Next 5 appointments (look out 90 days)    Oct 24, 2022  9:00 AM  (Arrive by 8:40 AM)  Adult Preventative Visit with Rd Gayle MD  [facility] [address] [place] [id number]  [phone]           Routing refill request to provider for review/approval because:  Labs not current:    Creatinine   Date Value Ref Range Status   06/23/2021 1.07 0.66 - 1.25 mg/dL Final     Potassium   Date Value Ref Range Status   06/23/2021 4.5 3.4 - 5.3 mmol/L Final     BP Readings from Last 3 Encounters:   06/23/21 136/80   05/12/21 130/84   12/12/18 126/76     Patient needs to be seen because it has been more than 1 year since last office visit.  Caitlin FAN RN

## 2022-10-24 ENCOUNTER — OFFICE VISIT (OUTPATIENT)
Dept: FAMILY MEDICINE | Facility: CLINIC | Age: 57
End: 2022-10-24
Payer: COMMERCIAL

## 2022-10-24 VITALS
RESPIRATION RATE: 16 BRPM | OXYGEN SATURATION: 99 % | WEIGHT: 256 LBS | TEMPERATURE: 97.4 F | BODY MASS INDEX: 34.67 KG/M2 | DIASTOLIC BLOOD PRESSURE: 74 MMHG | HEART RATE: 62 BPM | HEIGHT: 72 IN | SYSTOLIC BLOOD PRESSURE: 130 MMHG

## 2022-10-24 DIAGNOSIS — R73.09 ELEVATED GLUCOSE: ICD-10-CM

## 2022-10-24 DIAGNOSIS — I10 HYPERTENSION, UNSPECIFIED TYPE: ICD-10-CM

## 2022-10-24 DIAGNOSIS — Z12.11 SCREEN FOR COLON CANCER: ICD-10-CM

## 2022-10-24 DIAGNOSIS — E78.5 HYPERLIPIDEMIA LDL GOAL <130: ICD-10-CM

## 2022-10-24 DIAGNOSIS — Z00.00 WELL ADULT EXAM: Primary | ICD-10-CM

## 2022-10-24 DIAGNOSIS — Z72.0 TOBACCO USE: ICD-10-CM

## 2022-10-24 DIAGNOSIS — E66.01 MORBID OBESITY (H): ICD-10-CM

## 2022-10-24 PROBLEM — F10.20 ALCOHOL DEPENDENCE (H): Status: RESOLVED | Noted: 2021-05-12 | Resolved: 2022-10-24

## 2022-10-24 LAB
ANION GAP SERPL CALCULATED.3IONS-SCNC: 9 MMOL/L (ref 7–15)
BUN SERPL-MCNC: 20.7 MG/DL (ref 6–20)
CALCIUM SERPL-MCNC: 9.1 MG/DL (ref 8.6–10)
CHLORIDE SERPL-SCNC: 106 MMOL/L (ref 98–107)
CREAT SERPL-MCNC: 1.02 MG/DL (ref 0.67–1.17)
DEPRECATED HCO3 PLAS-SCNC: 25 MMOL/L (ref 22–29)
GFR SERPL CREATININE-BSD FRML MDRD: 86 ML/MIN/1.73M2
GLUCOSE SERPL-MCNC: 116 MG/DL (ref 70–99)
HBA1C MFR BLD: 5.2 % (ref 0–5.6)
POTASSIUM SERPL-SCNC: 4.7 MMOL/L (ref 3.4–5.3)
SODIUM SERPL-SCNC: 140 MMOL/L (ref 136–145)

## 2022-10-24 PROCEDURE — 83036 HEMOGLOBIN GLYCOSYLATED A1C: CPT | Performed by: FAMILY MEDICINE

## 2022-10-24 PROCEDURE — 99396 PREV VISIT EST AGE 40-64: CPT | Performed by: FAMILY MEDICINE

## 2022-10-24 PROCEDURE — 99213 OFFICE O/P EST LOW 20 MIN: CPT | Mod: 25 | Performed by: FAMILY MEDICINE

## 2022-10-24 PROCEDURE — 36415 COLL VENOUS BLD VENIPUNCTURE: CPT | Performed by: FAMILY MEDICINE

## 2022-10-24 PROCEDURE — 80048 BASIC METABOLIC PNL TOTAL CA: CPT | Performed by: FAMILY MEDICINE

## 2022-10-24 RX ORDER — LOSARTAN POTASSIUM 50 MG/1
50 TABLET ORAL DAILY
Qty: 90 TABLET | Refills: 3 | Status: SHIPPED | OUTPATIENT
Start: 2022-10-24 | End: 2023-11-01

## 2022-10-24 ASSESSMENT — ENCOUNTER SYMPTOMS
PARESTHESIAS: 0
NERVOUS/ANXIOUS: 0
ARTHRALGIAS: 0
DYSURIA: 0
SORE THROAT: 0
FREQUENCY: 1
DIARRHEA: 0
JOINT SWELLING: 0
ABDOMINAL PAIN: 0
HEMATOCHEZIA: 0
NAUSEA: 0
CHILLS: 0
HEMATURIA: 0
PALPITATIONS: 0
HEADACHES: 0
MYALGIAS: 0
SHORTNESS OF BREATH: 0
FEVER: 0
CONSTIPATION: 0
COUGH: 0
DIZZINESS: 0
EYE PAIN: 0
HEARTBURN: 0
WEAKNESS: 0

## 2022-10-24 ASSESSMENT — PAIN SCALES - GENERAL: PAINLEVEL: NO PAIN (0)

## 2022-10-24 NOTE — PROGRESS NOTES
SUBJECTIVE:   CC: Jaime is an 57 year old who presents for preventative health visit.     Patient has been advised of split billing requirements and indicates understanding: Yes  Healthy Habits:     Getting at least 3 servings of Calcium per day:  Yes    Bi-annual eye exam:  Yes    Dental care twice a year:  NO    Sleep apnea or symptoms of sleep apnea:  Daytime drowsiness    Diet:  Regular (no restrictions)    Frequency of exercise:  2-3 days/week    Duration of exercise:  15-30 minutes    Taking medications regularly:  Yes    Medication side effects:  Not applicable    PHQ-2 Total Score: 0    Additional concerns today:  No          Hypertension Follow-up      Do you check your blood pressure regularly outside of the clinic? Yes     Are you following a low salt diet? Yes    Are your blood pressures ever more than 140 on the top number (systolic) OR more than 90 on the bottom number (diastolic), for example 140/90? No  Fills and labs. Losartan monotherapy  Elevated glucose: rechekc  Morbid obesity: ongoing.  35 BMI with htn    Willing to get colonoscopy.      Today's PHQ-2 Score:   PHQ-2 ( 1999 Pfizer) 10/24/2022   Q1: Little interest or pleasure in doing things 0   Q2: Feeling down, depressed or hopeless 0   PHQ-2 Score 0   PHQ-2 Total Score (12-17 Years)- Positive if 3 or more points; Administer PHQ-A if positive -   Q1: Little interest or pleasure in doing things Not at all   Q2: Feeling down, depressed or hopeless Not at all   PHQ-2 Score 0       Abuse: Current or Past(Physical, Sexual or Emotional)- No  Do you feel safe in your environment? Yes    Have you ever done Advance Care Planning? (For example, a Health Directive, POLST, or a discussion with a medical provider or your loved ones about your wishes): No, advance care planning information given to patient to review.  Patient plans to discuss their wishes with loved ones or provider.      Social History     Tobacco Use     Smoking status: Never      Smokeless tobacco: Current     Types: Chew   Substance Use Topics     Alcohol use: Yes     Alcohol/week: 20.0 standard drinks     Types: 24 Cans of beer per week     Comment: 15 beers per week     If you drink alcohol do you typically have >3 drinks per day or >7 drinks per week? No    Alcohol Use 10/24/2022   Prescreen: >3 drinks/day or >7 drinks/week? Yes   Prescreen: >3 drinks/day or >7 drinks/week? -   AUDIT SCORE  7     AUDIT - Alcohol Use Disorders Identification Test - Reproduced from the World Health Organization Audit 2001 (Second Edition) 10/24/2022   1.  How often do you have a drink containing alcohol? 2 to 4 times a month   2.  How many drinks containing alcohol do you have on a typical day when you are drinking? 5 or 6   3.  How often do you have five or more drinks on one occasion? Weekly   4.  How often during the last year have you found that you were not able to stop drinking once you had started? Never   5.  How often during the last year have you failed to do what was normally expected of you because of drinking? Never   6.  How often during the last year have you needed a first drink in the morning to get yourself going after a heavy drinking session? Never   7.  How often during the last year have you had a feeling of guilt or remorse after drinking? Never   8.  How often during the last year have you been unable to remember what happened the night before because of your drinking? Never   9.  Have you or someone else been injured because of your drinking? No   10. Has a relative, friend, doctor or other health care worker been concerned about your drinking or suggested you cut down? No   TOTAL SCORE 7       Last PSA: No results found for: PSA    Reviewed orders with patient. Reviewed health maintenance and updated orders accordingly - {     Reviewed and updated as needed this visit by clinical staff   Tobacco  Allergies  Meds   Med Hx  Surg Hx  Fam Hx  Soc Hx        Reviewed and updated  "as needed this visit by Provider                     Review of Systems   Constitutional: Negative for chills and fever.   HENT: Negative for congestion, ear pain, hearing loss and sore throat.    Eyes: Negative for pain and visual disturbance.   Respiratory: Negative for cough and shortness of breath.    Cardiovascular: Negative for chest pain, palpitations and peripheral edema.   Gastrointestinal: Negative for abdominal pain, constipation, diarrhea, heartburn, hematochezia and nausea.   Genitourinary: Positive for frequency. Negative for dysuria, genital sores, hematuria, impotence, penile discharge and urgency.   Musculoskeletal: Negative for arthralgias, joint swelling and myalgias.   Skin: Negative for rash.   Neurological: Negative for dizziness, weakness, headaches and paresthesias.   Psychiatric/Behavioral: Negative for mood changes. The patient is not nervous/anxious.          OBJECTIVE:   /74   Pulse 62   Temp 97.4  F (36.3  C) (Tympanic)   Resp 16   Ht 1.816 m (5' 11.5\")   Wt 116.1 kg (256 lb)   SpO2 99%   BMI 35.21 kg/m      Physical Exam  Gen: alert and oriented, in no acute distress, affect within normal limits  Neck: supple with no masses or nodes  Throat: oropharynx clear, no exudate or tonsillar/palate asymmetry.    CV: RRR, no murmur  Lungs: clear bilaterally with good effort  Abd: nontender, no mass  Ext: no edema or lesions   Neuro: moving all extremities, gait normal, no focal deficts noted      ASSESSMENT/PLAN:   Well exam  Htn, stable  Morbid obesity, ongoing  Elevated glucose, recheck    Labs.  Fills.  Colonoscopy.  Doing well.  Keep working on wt loss.            COUNSELING:   Reviewed preventive health counseling, as reflected in patient instructions       Regular exercise       Healthy diet/nutrition    Estimated body mass index is 35.21 kg/m  as calculated from the following:    Height as of this encounter: 1.816 m (5' 11.5\").    Weight as of this encounter: 116.1 kg (256 " lb).     Weight management plan: diet/exercise     He reports that he has never smoked. His smokeless tobacco use includes chew.  Nicotine/Tobacco Cessation Plan:   not ready        Rd aGyle MD  [facility]

## 2022-10-30 ENCOUNTER — HEALTH MAINTENANCE LETTER (OUTPATIENT)
Age: 57
End: 2022-10-30

## 2023-07-14 NOTE — ADDENDUM NOTE
Addended by: RAE LARIOS on: 5/6/2020 04:29 PM     Modules accepted: Orders    
Follow instructions as ordered

## 2023-09-06 ENCOUNTER — ANCILLARY PROCEDURE (OUTPATIENT)
Dept: GENERAL RADIOLOGY | Facility: CLINIC | Age: 58
End: 2023-09-06
Attending: NURSE PRACTITIONER
Payer: COMMERCIAL

## 2023-09-06 ENCOUNTER — OFFICE VISIT (OUTPATIENT)
Dept: FAMILY MEDICINE | Facility: CLINIC | Age: 58
End: 2023-09-06
Payer: COMMERCIAL

## 2023-09-06 VITALS
SYSTOLIC BLOOD PRESSURE: 150 MMHG | HEART RATE: 59 BPM | BODY MASS INDEX: 36.08 KG/M2 | RESPIRATION RATE: 16 BRPM | DIASTOLIC BLOOD PRESSURE: 88 MMHG | OXYGEN SATURATION: 98 % | HEIGHT: 70 IN | TEMPERATURE: 97.8 F | WEIGHT: 252 LBS

## 2023-09-06 DIAGNOSIS — M25.441 FINGER JOINT SWELLING, RIGHT: Primary | ICD-10-CM

## 2023-09-06 DIAGNOSIS — M25.441 FINGER JOINT SWELLING, RIGHT: ICD-10-CM

## 2023-09-06 LAB
ANION GAP SERPL CALCULATED.3IONS-SCNC: 9 MMOL/L (ref 7–15)
BASOPHILS # BLD AUTO: 0.1 10E3/UL (ref 0–0.2)
BASOPHILS NFR BLD AUTO: 1 %
BUN SERPL-MCNC: 15.8 MG/DL (ref 6–20)
CALCIUM SERPL-MCNC: 9.7 MG/DL (ref 8.6–10)
CHLORIDE SERPL-SCNC: 105 MMOL/L (ref 98–107)
CREAT SERPL-MCNC: 1 MG/DL (ref 0.67–1.17)
CRP SERPL-MCNC: 6.39 MG/L
DEPRECATED HCO3 PLAS-SCNC: 26 MMOL/L (ref 22–29)
EGFRCR SERPLBLD CKD-EPI 2021: 88 ML/MIN/1.73M2
EOSINOPHIL # BLD AUTO: 0.2 10E3/UL (ref 0–0.7)
EOSINOPHIL NFR BLD AUTO: 2 %
ERYTHROCYTE [DISTWIDTH] IN BLOOD BY AUTOMATED COUNT: 12.1 % (ref 10–15)
ERYTHROCYTE [SEDIMENTATION RATE] IN BLOOD BY WESTERGREN METHOD: 9 MM/HR (ref 0–20)
GLUCOSE SERPL-MCNC: 94 MG/DL (ref 70–99)
HCT VFR BLD AUTO: 38.7 % (ref 40–53)
HGB BLD-MCNC: 13.3 G/DL (ref 13.3–17.7)
IMM GRANULOCYTES # BLD: 0 10E3/UL
IMM GRANULOCYTES NFR BLD: 0 %
LYMPHOCYTES # BLD AUTO: 2.8 10E3/UL (ref 0.8–5.3)
LYMPHOCYTES NFR BLD AUTO: 26 %
MCH RBC QN AUTO: 30.9 PG (ref 26.5–33)
MCHC RBC AUTO-ENTMCNC: 34.4 G/DL (ref 31.5–36.5)
MCV RBC AUTO: 90 FL (ref 78–100)
MONOCYTES # BLD AUTO: 0.8 10E3/UL (ref 0–1.3)
MONOCYTES NFR BLD AUTO: 7 %
NEUTROPHILS # BLD AUTO: 7.1 10E3/UL (ref 1.6–8.3)
NEUTROPHILS NFR BLD AUTO: 65 %
PLATELET # BLD AUTO: 247 10E3/UL (ref 150–450)
POTASSIUM SERPL-SCNC: 4.2 MMOL/L (ref 3.4–5.3)
RBC # BLD AUTO: 4.31 10E6/UL (ref 4.4–5.9)
SODIUM SERPL-SCNC: 140 MMOL/L (ref 136–145)
URATE SERPL-MCNC: 6.9 MG/DL (ref 3.4–7)
WBC # BLD AUTO: 11 10E3/UL (ref 4–11)

## 2023-09-06 PROCEDURE — 73140 X-RAY EXAM OF FINGER(S): CPT | Mod: TC | Performed by: RADIOLOGY

## 2023-09-06 PROCEDURE — 85025 COMPLETE CBC W/AUTO DIFF WBC: CPT | Performed by: NURSE PRACTITIONER

## 2023-09-06 PROCEDURE — 85652 RBC SED RATE AUTOMATED: CPT | Performed by: NURSE PRACTITIONER

## 2023-09-06 PROCEDURE — 86140 C-REACTIVE PROTEIN: CPT | Performed by: NURSE PRACTITIONER

## 2023-09-06 PROCEDURE — 36415 COLL VENOUS BLD VENIPUNCTURE: CPT | Performed by: NURSE PRACTITIONER

## 2023-09-06 PROCEDURE — 84550 ASSAY OF BLOOD/URIC ACID: CPT | Performed by: NURSE PRACTITIONER

## 2023-09-06 PROCEDURE — 99214 OFFICE O/P EST MOD 30 MIN: CPT | Performed by: NURSE PRACTITIONER

## 2023-09-06 PROCEDURE — 80048 BASIC METABOLIC PNL TOTAL CA: CPT | Performed by: NURSE PRACTITIONER

## 2023-09-06 RX ORDER — PREDNISONE 20 MG/1
40 TABLET ORAL DAILY
Qty: 10 TABLET | Refills: 0 | Status: SHIPPED | OUTPATIENT
Start: 2023-09-06 | End: 2023-09-11

## 2023-09-06 ASSESSMENT — PAIN SCALES - GENERAL: PAINLEVEL: MILD PAIN (3)

## 2023-09-06 NOTE — PATIENT INSTRUCTIONS
I do not see an obvious abnormality on xray today, but I will call you if the radiologist reads this differently.  Labs today - I suspect this is gout.  Prednisone for 5 days.  Prednisone Discharge Instructions:  Please take the steroid, Prednisone, for the full course as prescribed.  Take Prednisone with food or milk to minimize stomach upset.      Side effects of Prednisone include difficulty sleeping, increased appetite, weight gain, and changes in mood.  If you are diabetic, please monitor your blood sugar regularly while taking this medicine as Prednisone can cause high blood sugar.

## 2023-09-06 NOTE — PROGRESS NOTES
"  Assessment & Plan     Finger joint swelling, right  XR negative, no injury. Exam not c/w a flexor tenosynovitis. I am suspicious this is gout. Will get labs today, start prednisone. RTC if worsening.  - XR Finger Right G/E 2 Views; Future  - Basic metabolic panel  (Ca, Cl, CO2, Creat, Gluc, K, Na, BUN); Future  - CBC with platelets and differential; Future  - CRP, inflammation; Future  - ESR: Erythrocyte sedimentation rate; Future  - predniSONE (DELTASONE) 20 MG tablet; Take 2 tablets (40 mg) by mouth daily for 5 days  - Uric acid; Future         BMI:   Estimated body mass index is 35.9 kg/m  as calculated from the following:    Height as of this encounter: 1.784 m (5' 10.25\").    Weight as of this encounter: 114.3 kg (252 lb).       Patient Instructions   I do not see an obvious abnormality on xray today, but I will call you if the radiologist reads this differently.  Labs today - I suspect this is gout.  Prednisone for 5 days.  Prednisone Discharge Instructions:  Please take the steroid, Prednisone, for the full course as prescribed.  Take Prednisone with food or milk to minimize stomach upset.      Side effects of Prednisone include difficulty sleeping, increased appetite, weight gain, and changes in mood.  If you are diabetic, please monitor your blood sugar regularly while taking this medicine as Prednisone can cause high blood sugar.      JARED Gustafson CNP  [facility]    Martha Sandoval is a 58 year old, presenting for the following health issues:  Finger (Right hand, pointer finger and knuckle are painful and swollen. Was golfing about a month ago and felt a twinge and has since had pain and swelling with certain activity. Is constantly achy. Advil kind of helps but mildly.)        9/6/2023     1:22 PM   Additional Questions   Roomed by Esther QUIÑONEZ   Accompanied by self       History of Present Illness       Reason for visit:  Sore right finger and knuckle  Symptom onset:  " "1-2 weeks ago  Symptom intensity:  Moderate  Symptom progression:  Staying the same  Had these symptoms before:  No  What makes it better:  Advil    He eats 2-3 servings of fruits and vegetables daily.He consumes 1 sweetened beverage(s) daily.He exercises with enough effort to increase his heart rate 10 to 19 minutes per day.  He exercises with enough effort to increase his heart rate 4 days per week.   He is taking medications regularly.     Above HPI reviewed. Additionally, about one week of swelling at the right first MCPJ to PIPJ. Now pain to wrist as well. Started after golf. Has previously been treated for suspicion of gout, has had one elevated uric acid about 10 years ago, more recently normal. Did have a arthrocentesis for evaluation of fluid in 2017 which was negative but only a small amount of fluid was aspirated. No fevers. No erythema of joints.        Review of Systems   Constitutional, HEENT, cardiovascular, pulmonary, gi and gu systems are negative, except as otherwise noted.      Objective    BP (!) 168/94   Pulse 59   Temp 97.8  F (36.6  C) (Tympanic)   Resp 16   Ht 1.784 m (5' 10.25\")   Wt 114.3 kg (252 lb)   SpO2 98%   BMI 35.90 kg/m    Body mass index is 35.9 kg/m .  Physical Exam  Vitals and nursing note reviewed.   Constitutional:       Appearance: Normal appearance.   HENT:      Head: Normocephalic and atraumatic.      Mouth/Throat:      Mouth: Mucous membranes are moist.   Cardiovascular:      Rate and Rhythm: Normal rate.   Pulmonary:      Effort: Pulmonary effort is normal.   Musculoskeletal:      Cervical back: Neck supple.      Comments: Right index finger with swelling noted from PIPJ to MCPJ. Flexion somewhat limited by swelling, normal extension without pain. No TTP over tendon sheath. TTP at MCPJ. No overlying erythema. CMS intact.   Skin:     General: Skin is warm and dry.   Neurological:      General: No focal deficit present.      Mental Status: He is alert.   Psychiatric:  "        Mood and Affect: Mood normal.         Behavior: Behavior normal.        XR right index finger - normal on my review. Pending radiology interpretation.    Results for orders placed or performed in visit on 09/06/23 (from the past 24 hour(s))   CBC with platelets and differential    Narrative    The following orders were created for panel order CBC with platelets and differential.  Procedure                               Abnormality         Status                     ---------                               -----------         ------                     CBC with platelets and d...[[id number]]                      In process                   Please view results for these tests on the individual orders.

## 2023-09-11 ENCOUNTER — OFFICE VISIT (OUTPATIENT)
Dept: FAMILY MEDICINE | Facility: CLINIC | Age: 58
End: 2023-09-11
Payer: COMMERCIAL

## 2023-09-11 VITALS
TEMPERATURE: 97.1 F | HEIGHT: 70 IN | RESPIRATION RATE: 18 BRPM | BODY MASS INDEX: 35.79 KG/M2 | WEIGHT: 250 LBS | HEART RATE: 57 BPM | OXYGEN SATURATION: 98 % | DIASTOLIC BLOOD PRESSURE: 78 MMHG | SYSTOLIC BLOOD PRESSURE: 136 MMHG

## 2023-09-11 DIAGNOSIS — R22.31 LOCALIZED SWELLING OF FINGER OF RIGHT HAND: Primary | ICD-10-CM

## 2023-09-11 LAB
BASOPHILS # BLD AUTO: 0.1 10E3/UL (ref 0–0.2)
BASOPHILS NFR BLD AUTO: 0 %
CRP SERPL-MCNC: 7.85 MG/L
EOSINOPHIL # BLD AUTO: 0.1 10E3/UL (ref 0–0.7)
EOSINOPHIL NFR BLD AUTO: 1 %
ERYTHROCYTE [DISTWIDTH] IN BLOOD BY AUTOMATED COUNT: 12.1 % (ref 10–15)
ERYTHROCYTE [SEDIMENTATION RATE] IN BLOOD BY WESTERGREN METHOD: 9 MM/HR (ref 0–20)
HCT VFR BLD AUTO: 40.3 % (ref 40–53)
HGB BLD-MCNC: 13.9 G/DL (ref 13.3–17.7)
IMM GRANULOCYTES # BLD: 0.1 10E3/UL
IMM GRANULOCYTES NFR BLD: 1 %
LYMPHOCYTES # BLD AUTO: 4.6 10E3/UL (ref 0.8–5.3)
LYMPHOCYTES NFR BLD AUTO: 29 %
MCH RBC QN AUTO: 30.8 PG (ref 26.5–33)
MCHC RBC AUTO-ENTMCNC: 34.5 G/DL (ref 31.5–36.5)
MCV RBC AUTO: 89 FL (ref 78–100)
MONOCYTES # BLD AUTO: 1.2 10E3/UL (ref 0–1.3)
MONOCYTES NFR BLD AUTO: 8 %
NEUTROPHILS # BLD AUTO: 9.7 10E3/UL (ref 1.6–8.3)
NEUTROPHILS NFR BLD AUTO: 61 %
PLATELET # BLD AUTO: 291 10E3/UL (ref 150–450)
RBC # BLD AUTO: 4.52 10E6/UL (ref 4.4–5.9)
WBC # BLD AUTO: 15.8 10E3/UL (ref 4–11)

## 2023-09-11 PROCEDURE — 85652 RBC SED RATE AUTOMATED: CPT | Performed by: NURSE PRACTITIONER

## 2023-09-11 PROCEDURE — 85025 COMPLETE CBC W/AUTO DIFF WBC: CPT | Performed by: NURSE PRACTITIONER

## 2023-09-11 PROCEDURE — 36415 COLL VENOUS BLD VENIPUNCTURE: CPT | Performed by: NURSE PRACTITIONER

## 2023-09-11 PROCEDURE — 99214 OFFICE O/P EST MOD 30 MIN: CPT | Performed by: NURSE PRACTITIONER

## 2023-09-11 PROCEDURE — 86140 C-REACTIVE PROTEIN: CPT | Performed by: NURSE PRACTITIONER

## 2023-09-11 RX ORDER — HYDROCODONE BITARTRATE AND ACETAMINOPHEN 5; 325 MG/1; MG/1
1 TABLET ORAL EVERY 6 HOURS PRN
Qty: 10 TABLET | Refills: 0 | Status: SHIPPED | OUTPATIENT
Start: 2023-09-11 | End: 2023-11-01

## 2023-09-11 RX ORDER — HYDROCODONE BITARTRATE AND ACETAMINOPHEN 5; 325 MG/1; MG/1
1 TABLET ORAL EVERY 6 HOURS PRN
Qty: 10 TABLET | Refills: 0 | Status: SHIPPED | OUTPATIENT
Start: 2023-09-11 | End: 2023-09-11

## 2023-09-11 ASSESSMENT — PAIN SCALES - GENERAL: PAINLEVEL: EXTREME PAIN (9)

## 2023-09-11 NOTE — PROGRESS NOTES
"  Assessment & Plan     Localized swelling of finger of right hand  Seen by me last week with swelling over the MCP joint extending to the PIP joint of the right index finger.  At that point, exam did not seem to be consistent with a flexor tenosynovitis.  Labs were obtained as I had some concern for gout, however at that time a normal uric acid, essentially normal inflammatory markers, no leukocytosis.  He was trialed on prednisone, however he did not have any improvement and actually had subsequent worsening starting 2 days ago.  He now has significant swelling overlying the MCPJ with associated erythema, severe pain with passive extension of the digit.  He does have tenderness to palpation over the flexor tendon sheath.  Does have white count of 15.8, however was recently on prednisone, inflammatory markers are essentially normal, ESR is normal, CRP only mildly elevated.  I am concerned that this is a flexor tenosynovitis.  I did attempt to consult with hand surgery, however there is no one on-call with [facility] orthopedics or at [facility].  I did call and speak with the emergency department physician at [facility], and they do have hand on-call.  Discussed with patient, and he will proceed to the emergency department at [facility] for further evaluation.  - CBC with platelets and differential; Future  - CRP, inflammation; Future  - ESR: Erythrocyte sedimentation rate; Future  - HYDROcodone-acetaminophen (NORCO) 5-325 MG tablet; Take 1 tablet by mouth every 6 hours as needed for pain       BMI:   Estimated body mass index is 35.62 kg/m  as calculated from the following:    Height as of this encounter: 1.784 m (5' 10.25\").    Weight as of this encounter: 113.4 kg (250 lb).         JARED Gustafson [employer]    Martha Sandoval is a 58 year old, presenting for the following health issues:  Follow Up (Recheck finger/)        9/11/2023    12:35 PM   Additional Questions   Roomed " "by Esther QUIÑONEZ   Accompanied by self       HPI     Hand is not improving since last week. Finished prednisone prescription yesterday.    Above HPI reviewed. Additionally, seen by me on September 6 for localized swelling of the right index finger.  He is right-hand dominant.  At that time, he had been golfing several days prior and had an insidious onset of pain overlying the first MCP J of the right hand which eventually extended into pain and swelling extending to the PIPJ.  Does have a history of gout, and at that time I was suspicious that this was gout related as there was no erythema and he was still able to flex and extend his finger without significant pain.  At that time, labs were drawn, no leukocytosis, ESR was normal, CRP only very mildly elevated, normal uric acid.  X-ray was essentially unremarkable with the exception of a small calcification at the volar margin of the MCPJ.  At the he was treated with a 5-day course of prednisone.  He notes that he had no improvement in symptoms.  2 days ago, symptoms suddenly worsened, he had significant pain over the first MCP J with swelling extending over the dorsal aspect of the hand.  Yesterday, he developed erythema overlying the first MCP J and now has very limited range of motion of his index finger.  He has not had fevers or chills, no ill symptoms.  Pain is so severe that he has been unable to sleep or work.        Review of Systems   Constitutional, HEENT, cardiovascular, pulmonary, gi and gu systems are negative, except as otherwise noted.      Objective    BP (!) 160/110   Pulse 57   Temp 97.1  F (36.2  C) (Tympanic)   Resp 18   Ht 1.784 m (5' 10.25\")   Wt 113.4 kg (250 lb)   SpO2 98%   BMI 35.62 kg/m    Body mass index is 35.62 kg/m .  Physical Exam  Vitals and nursing note reviewed.   Constitutional:       Appearance: Normal appearance. He is not ill-appearing or toxic-appearing.   HENT:      Head: Normocephalic and atraumatic.      Mouth/Throat:     "  Mouth: Mucous membranes are moist.   Cardiovascular:      Rate and Rhythm: Normal rate.   Pulmonary:      Effort: Pulmonary effort is normal.   Musculoskeletal:      Cervical back: Neck supple.      Comments: Right hand-diffuse swelling to the dorsum of the hand.  Fusiform swelling of the index finger.  There is erythema overlying the first MCP J.  Finger is held in a flexed position, severe pain with passive extension of the finger.  Tenderness to palpation of the flexor tendon sheath.   Skin:     General: Skin is warm and dry.   Neurological:      General: No focal deficit present.      Mental Status: He is alert.   Psychiatric:         Mood and Affect: Mood normal.         Behavior: Behavior normal.            Results for orders placed or performed in visit on 09/11/23 (from the past 24 hour(s))   CBC with platelets and differential    Narrative    The following orders were created for panel order CBC with platelets and differential.  Procedure                               Abnormality         Status                     ---------                               -----------         ------                     CBC with platelets and d...[[id number]]  Abnormal            Final result                 Please view results for these tests on the individual orders.   CRP, inflammation   Result Value Ref Range    CRP Inflammation 7.85 (H) <5.00 mg/L   ESR: Erythrocyte sedimentation rate   Result Value Ref Range    Erythrocyte Sedimentation Rate 9 0 - 20 mm/hr   CBC with platelets and differential   Result Value Ref Range    WBC Count 15.8 (H) 4.0 - 11.0 10e3/uL    RBC Count 4.52 4.40 - 5.90 10e6/uL    Hemoglobin 13.9 13.3 - 17.7 g/dL    Hematocrit 40.3 40.0 - 53.0 %    MCV 89 78 - 100 fL    MCH 30.8 26.5 - 33.0 pg    MCHC 34.5 31.5 - 36.5 g/dL    RDW 12.1 10.0 - 15.0 %    Platelet Count 291 150 - 450 10e3/uL    % Neutrophils 61 %    % Lymphocytes 29 %    % Monocytes 8 %    % Eosinophils 1 %    % Basophils 0 %    % Immature  Granulocytes 1 %    Absolute Neutrophils 9.7 (H) 1.6 - 8.3 10e3/uL    Absolute Lymphocytes 4.6 0.8 - 5.3 10e3/uL    Absolute Monocytes 1.2 0.0 - 1.3 10e3/uL    Absolute Eosinophils 0.1 0.0 - 0.7 10e3/uL    Absolute Basophils 0.1 0.0 - 0.2 10e3/uL    Absolute Immature Granulocytes 0.1 <=0.4 10e3/uL

## 2023-09-25 ENCOUNTER — PATIENT OUTREACH (OUTPATIENT)
Dept: CARE COORDINATION | Facility: CLINIC | Age: 58
End: 2023-09-25
Payer: COMMERCIAL

## 2023-10-09 ENCOUNTER — PATIENT OUTREACH (OUTPATIENT)
Dept: CARE COORDINATION | Facility: CLINIC | Age: 58
End: 2023-10-09
Payer: COMMERCIAL

## 2023-10-31 ENCOUNTER — PATIENT OUTREACH (OUTPATIENT)
Dept: GASTROENTEROLOGY | Facility: CLINIC | Age: 58
End: 2023-10-31
Payer: COMMERCIAL

## 2023-11-01 ENCOUNTER — VIRTUAL VISIT (OUTPATIENT)
Dept: FAMILY MEDICINE | Facility: CLINIC | Age: 58
End: 2023-11-01
Payer: COMMERCIAL

## 2023-11-01 DIAGNOSIS — I10 HYPERTENSION, UNSPECIFIED TYPE: Primary | ICD-10-CM

## 2023-11-01 DIAGNOSIS — I10 HYPERTENSION, UNSPECIFIED TYPE: ICD-10-CM

## 2023-11-01 DIAGNOSIS — M1A.0410 CHRONIC GOUT OF RIGHT HAND, UNSPECIFIED CAUSE: ICD-10-CM

## 2023-11-01 DIAGNOSIS — Z12.11 SCREEN FOR COLON CANCER: ICD-10-CM

## 2023-11-01 PROBLEM — F10.20 ALCOHOL DEPENDENCE (H): Status: ACTIVE | Noted: 2023-11-01

## 2023-11-01 PROCEDURE — 99214 OFFICE O/P EST MOD 30 MIN: CPT | Mod: 93 | Performed by: FAMILY MEDICINE

## 2023-11-01 RX ORDER — COLCHICINE 0.6 MG/1
0.6 TABLET ORAL 2 TIMES DAILY
Qty: 60 TABLET | Refills: 1 | Status: SHIPPED | OUTPATIENT
Start: 2023-11-01

## 2023-11-01 RX ORDER — ALLOPURINOL 100 MG/1
200 TABLET ORAL DAILY
Qty: 180 TABLET | Refills: 3 | Status: SHIPPED | OUTPATIENT
Start: 2023-11-01

## 2023-11-01 RX ORDER — LOSARTAN POTASSIUM 50 MG/1
50 TABLET ORAL DAILY
Qty: 90 TABLET | Refills: 3 | Status: SHIPPED | OUTPATIENT
Start: 2023-11-01

## 2023-11-01 RX ORDER — LOSARTAN POTASSIUM 50 MG/1
50 TABLET ORAL DAILY
Qty: 90 TABLET | Refills: 3 | Status: CANCELLED | OUTPATIENT
Start: 2023-11-01

## 2023-11-01 NOTE — PROGRESS NOTES
Jaime is a 58 year old who is being evaluated via a billable telephone visit.      What phone number would you like to be contacted at? home  How would you like to obtain your AVS? Demetri    Distant Location (provider location):  On-site        Subjective   Jaime is a 58 year old, presenting for the following health issues:  Hypertension  And wants to start allopurinol for gout   Reviewed recent hospitalization for gout, confirmed on crystal analysis.      History of Present Illness       Reason for visit:  Med refill and talk about starting alipurinol med    He eats 2-3 servings of fruits and vegetables daily.He consumes 1 sweetened beverage(s) daily.He exercises with enough effort to increase his heart rate 10 to 19 minutes per day.  He exercises with enough effort to increase his heart rate 4 days per week.   He is taking medications regularly.                   Objective           Vitals:  No vitals were obtained today due to virtual visit.    Physical Exam   healthy, alert, and no distress  PSYCH: Alert and oriented times 3; coherent speech, normal   rate and volume, able to articulate logical thoughts, able   to abstract reason, no tangential thoughts, no hallucinations   or delusions  His affect is normal  RESP: No cough, no audible wheezing, able to talk in full sentences  Remainder of exam unable to be completed due to telephone visits    A: htn, stable       Gout, new    P: alllopurinol and colchicine.  Stop colchicine after 2 months.    Fills on losartan.  Labs OK within last couple months    Follow-up prn.          Phone call duration: 12 minutes

## 2023-12-04 ASSESSMENT — ENCOUNTER SYMPTOMS
FREQUENCY: 0
CONSTIPATION: 0
DIARRHEA: 0
PARESTHESIAS: 0
MYALGIAS: 0
PALPITATIONS: 0
NERVOUS/ANXIOUS: 0
COUGH: 0
JOINT SWELLING: 0
ABDOMINAL PAIN: 0
SORE THROAT: 0
NAUSEA: 0
WEAKNESS: 0
CHILLS: 0
FEVER: 0
HEADACHES: 0
HEARTBURN: 0
DYSURIA: 0
SHORTNESS OF BREATH: 0
HEMATOCHEZIA: 0
DIZZINESS: 0
HEMATURIA: 0
ARTHRALGIAS: 0
EYE PAIN: 0

## 2023-12-11 ENCOUNTER — OFFICE VISIT (OUTPATIENT)
Dept: FAMILY MEDICINE | Facility: CLINIC | Age: 58
End: 2023-12-11
Payer: COMMERCIAL

## 2023-12-11 VITALS
TEMPERATURE: 98.6 F | BODY MASS INDEX: 35 KG/M2 | OXYGEN SATURATION: 98 % | RESPIRATION RATE: 16 BRPM | HEIGHT: 71 IN | DIASTOLIC BLOOD PRESSURE: 82 MMHG | HEART RATE: 64 BPM | WEIGHT: 250 LBS | SYSTOLIC BLOOD PRESSURE: 138 MMHG

## 2023-12-11 DIAGNOSIS — I10 HYPERTENSION, UNSPECIFIED TYPE: ICD-10-CM

## 2023-12-11 DIAGNOSIS — E66.01 MORBID OBESITY (H): ICD-10-CM

## 2023-12-11 DIAGNOSIS — Z00.00 WELL ADULT EXAM: Primary | ICD-10-CM

## 2023-12-11 DIAGNOSIS — M1A.0410 CHRONIC GOUT OF RIGHT HAND, UNSPECIFIED CAUSE: ICD-10-CM

## 2023-12-11 DIAGNOSIS — E78.5 HYPERLIPIDEMIA LDL GOAL <130: ICD-10-CM

## 2023-12-11 PROCEDURE — 99396 PREV VISIT EST AGE 40-64: CPT | Mod: 25 | Performed by: FAMILY MEDICINE

## 2023-12-11 PROCEDURE — 99214 OFFICE O/P EST MOD 30 MIN: CPT | Mod: 25 | Performed by: FAMILY MEDICINE

## 2023-12-11 ASSESSMENT — ENCOUNTER SYMPTOMS
NAUSEA: 0
PALPITATIONS: 0
HEARTBURN: 0
ABDOMINAL PAIN: 0
DYSURIA: 0
PARESTHESIAS: 0
SORE THROAT: 0
EYE PAIN: 0
CHILLS: 0
FEVER: 0
HEMATURIA: 0
COUGH: 0
FREQUENCY: 0
NERVOUS/ANXIOUS: 0
JOINT SWELLING: 0
ARTHRALGIAS: 0
MYALGIAS: 0
HEADACHES: 0
DIARRHEA: 0
CONSTIPATION: 0
HEMATOCHEZIA: 0
DIZZINESS: 0
WEAKNESS: 0
SHORTNESS OF BREATH: 0

## 2023-12-11 ASSESSMENT — PAIN SCALES - GENERAL: PAINLEVEL: NO PAIN (0)

## 2023-12-11 NOTE — PROGRESS NOTES
SUBJECTIVE:   Jaime is a 58 year old, presenting for the following:  Physical        12/11/2023     2:27 PM   Additional Questions   Roomed by michael CAMPUZANO   Accompanied by self       Healthy Habits:     Getting at least 3 servings of Calcium per day:  Yes    Bi-annual eye exam:  Yes    Dental care twice a year:  NO    Sleep apnea or symptoms of sleep apnea:  None    Diet:  Regular (no restrictions)    Frequency of exercise:  2-3 days/week    Duration of exercise:  15-30 minutes    Taking medications regularly:  Yes    Medication side effects:  None    Additional concerns today:  No                Has not started allopurinol- has questions on medication   Gout flare, confirmed on aspiration.  Wanted to know if OK to wait on allopurinol and colchicine?  Knows about need for coverage for beginning allopurinol    Htn:  borderline, sometimes high.  May need more than monotherapy with losartan.  Knows wt loss would help    Morbid obesity: 35 BMI with htn.  Working on wt loss with wife.         Social History     Tobacco Use    Smoking status: Never    Smokeless tobacco: Current     Types: Chew   Substance Use Topics    Alcohol use: Yes     Alcohol/week: 20.0 standard drinks of alcohol     Types: 24 Cans of beer per week     Comment: 15 beers per week             12/4/2023    10:41 AM   Alcohol Use   Prescreen: >3 drinks/day or >7 drinks/week? No         10/24/2022     8:45 AM   AUDIT - Alcohol Use Disorders Identification Test - Reproduced from the World Health Organization Audit 2001 (Second Edition)   1.  How often do you have a drink containing alcohol? 2 to 4 times a month   2.  How many drinks containing alcohol do you have on a typical day when you are drinking? 5 or 6   3.  How often do you have five or more drinks on one occasion? Weekly   4.  How often during the last year have you found that you were not able to stop drinking once you had started? Never   5.  How often during the last year have you failed to do  "what was normally expected of you because of drinking? Never   6.  How often during the last year have you needed a first drink in the morning to get yourself going after a heavy drinking session? Never   7.  How often during the last year have you had a feeling of guilt or remorse after drinking? Never   8.  How often during the last year have you been unable to remember what happened the night before because of your drinking? Never   9.  Have you or someone else been injured because of your drinking? No   10. Has a relative, friend, doctor or other health care worker been concerned about your drinking or suggested you cut down? No   TOTAL SCORE 7       Last PSA: No results found for: \"PSA\"    Reviewed orders with patient. Reviewed health maintenance and updated orders accordingly -       Reviewed and updated as needed this visit by clinical staff   Tobacco  Allergies  Meds              Reviewed and updated as needed this visit by Provider                     Review of Systems   Constitutional:  Negative for chills and fever.   HENT:  Negative for congestion, ear pain, hearing loss and sore throat.    Eyes:  Negative for pain and visual disturbance.   Respiratory:  Negative for cough and shortness of breath.    Cardiovascular:  Negative for chest pain, palpitations and peripheral edema.   Gastrointestinal:  Negative for abdominal pain, constipation, diarrhea, heartburn, hematochezia and nausea.   Genitourinary:  Negative for dysuria, frequency, genital sores, hematuria, impotence, penile discharge and urgency.   Musculoskeletal:  Negative for arthralgias, joint swelling and myalgias.   Skin:  Negative for rash.   Neurological:  Negative for dizziness, weakness, headaches and paresthesias.   Psychiatric/Behavioral:  Negative for mood changes. The patient is not nervous/anxious.          OBJECTIVE:   /82   Pulse 64   Temp 98.6  F (37  C) (Tympanic)   Resp 16   Ht 1.791 m (5' 10.5\")   Wt 113.4 kg (250 " "lb)   SpO2 98%   BMI 35.36 kg/m      Physical Exam  Gen: alert and oriented, in no acute distress, affect within normal limits  Neck: supple with no masses or nodes  Throat: oropharynx clear, no exudate or tonsillar/palate asymmetry.    CV: RRR, no murmur  Lungs: clear bilaterally with good effort  Abd: nontender, no mass  Ext: no edema or lesions   Neuro: moving all extremities, gait normal, no focal deficts noted      ASSESSMENT/PLAN:   Well exam  Gout   Morbid obesity  Htn, borderline    He is going to think about alloopurinol.  If he goes on it, will add diuretic to his regimen likely next time for BP.  If not, consider amlodipine          COUNSELING:   Reviewed preventive health counseling, as reflected in patient instructions       Regular exercise       Healthy diet/nutrition      BMI:   Estimated body mass index is 35.62 kg/m  as calculated from the following:    Height as of 9/11/23: 1.784 m (5' 10.25\").    Weight as of 9/11/23: 113.4 kg (250 lb).   Weight management plan: diet/exercise      He reports that he has never smoked. His smokeless tobacco use includes chew.        Rd Gayle MD  [facility]  "

## 2024-02-23 ENCOUNTER — ANESTHESIA EVENT (OUTPATIENT)
Dept: GASTROENTEROLOGY | Facility: CLINIC | Age: 59
End: 2024-02-23
Payer: COMMERCIAL

## 2024-02-23 RX ORDER — ONDANSETRON 2 MG/ML
4 INJECTION INTRAMUSCULAR; INTRAVENOUS EVERY 30 MIN PRN
Status: CANCELLED | OUTPATIENT
Start: 2024-02-23

## 2024-02-23 RX ORDER — ONDANSETRON 4 MG/1
4 TABLET, ORALLY DISINTEGRATING ORAL EVERY 30 MIN PRN
Status: CANCELLED | OUTPATIENT
Start: 2024-02-23

## 2024-02-23 NOTE — ANESTHESIA PREPROCEDURE EVALUATION
Anesthesia Pre-Procedure Evaluation    Patient: Jaime Griffiths   MRN: [id number] : 1965        Procedure : Procedure(s):  Colonoscopy          Past Medical History:   Diagnosis Date    Allergic rhinitis, cause unspecified       Past Surgical History:   Procedure Laterality Date    ARTHROSCOPY KNEE Right 2014    Procedure: ARTHROSCOPY KNEE;  Surgeon: Nely, Wally HUGHES MD;  Location: [facility] OR    SURGICAL HISTORY OF -       appendectomy      Allergies   Allergen Reactions    Ace Inhibitors Cough    Nka [No Known Allergies]       Social History     Tobacco Use    Smoking status: Never    Smokeless tobacco: Current     Types: Chew   Substance Use Topics    Alcohol use: Yes     Alcohol/week: 20.0 standard drinks of alcohol     Types: 24 Cans of beer per week     Comment: 15 beers per week      Wt Readings from Last 1 Encounters:   23 113.4 kg (250 lb)        Anesthesia Evaluation   Pt has had prior anesthetic. Type: General.        ROS/MED HX  ENT/Pulmonary:     (+)     [place] risk factors,  hypertension, obese,   allergic rhinitis,                             Neurologic:  - neg neurologic ROS     Cardiovascular:     (+) Dyslipidemia hypertension- -   -  - -                                      METS/Exercise Tolerance:     Hematologic:  - neg hematologic  ROS     Musculoskeletal:   (+)  arthritis,             GI/Hepatic:     (+)        bowel prep,            Renal/Genitourinary:  - neg Renal ROS     Endo:     (+)               Obesity,       Psychiatric/Substance Use:     (+)   alcohol abuse      Infectious Disease:  - neg infectious disease ROS     Malignancy:  - neg malignancy ROS     Other:  - neg other ROS          Physical Exam    Airway  airway exam normal      Mallampati: II   TM distance: > 3 FB   Neck ROM: full   Mouth opening: > 3 cm    Respiratory Devices and Support         Dental       (+) Completely normal teeth      Cardiovascular   cardiovascular exam normal          Pulmonary   pulmonary exam  "normal                OUTSIDE LABS:  CBC:   Lab Results   Component Value Date    WBC 15.8 (H) 09/11/2023    WBC 11.0 09/06/2023    HGB 13.9 09/11/2023    HGB 13.3 09/06/2023    HCT 40.3 09/11/2023    HCT 38.7 (L) 09/06/2023     09/11/2023     09/06/2023     BMP:   Lab Results   Component Value Date     09/06/2023     10/24/2022    POTASSIUM 4.2 09/06/2023    POTASSIUM 4.7 10/24/2022    CHLORIDE 105 09/06/2023    CHLORIDE 106 10/24/2022    CO2 26 09/06/2023    CO2 25 10/24/2022    BUN 15.8 09/06/2023    BUN 20.7 (H) 10/24/2022    CR 1.00 09/06/2023    CR 1.02 10/24/2022    GLC 94 09/06/2023     (H) 10/24/2022     COAGS: No results found for: \"PTT\", \"INR\", \"FIBR\"  POC: No results found for: \"BGM\", \"HCG\", \"HCGS\"  HEPATIC:   Lab Results   Component Value Date    ALT 29 06/09/2008     OTHER:   Lab Results   Component Value Date    A1C 5.2 10/24/2022    LORAINE 9.7 09/06/2023    CRP 17.4 (H) 06/11/2014    SED 9 09/11/2023       Anesthesia Plan    ASA Status:  3    NPO Status:  NPO Appropriate    Anesthesia Type: General.     - Airway: Native airway   Induction: Propofol.           Consents    Anesthesia Plan(s) and associated risks, benefits, and realistic alternatives discussed. Questions answered and patient/representative(s) expressed understanding.     - Discussed: Risks, Benefits and Alternatives for BOTH SEDATION and the PROCEDURE were discussed     - Discussed with:  Patient      - Extended Intubation/Ventilatory Support Discussed: No.      - Patient is DNR/DNI Status: No     Use of blood products discussed: No .     Postoperative Care    Pain management: IV analgesics.   PONV prophylaxis: Background Propofol Infusion     Comments:               JARED Khanna CRNA    I have reviewed the pertinent notes and labs in the chart from the past 30 days and (re)examined the patient.  Any updates or changes from those notes are reflected in this note.                  "

## 2024-02-28 ENCOUNTER — HOSPITAL ENCOUNTER (OUTPATIENT)
Facility: CLINIC | Age: 59
Discharge: HOME | End: 2024-02-28
Attending: SURGERY | Admitting: SURGERY
Payer: COMMERCIAL

## 2024-02-28 ENCOUNTER — ANESTHESIA (OUTPATIENT)
Dept: GASTROENTEROLOGY | Facility: CLINIC | Age: 59
End: 2024-02-28
Payer: COMMERCIAL

## 2024-02-28 VITALS
HEIGHT: 70 IN | TEMPERATURE: 98 F | RESPIRATION RATE: 16 BRPM | WEIGHT: 250 LBS | HEART RATE: 56 BPM | DIASTOLIC BLOOD PRESSURE: 72 MMHG | OXYGEN SATURATION: 99 % | SYSTOLIC BLOOD PRESSURE: 120 MMHG | BODY MASS INDEX: 35.79 KG/M2

## 2024-02-28 LAB — COLONOSCOPY: NORMAL

## 2024-02-28 PROCEDURE — 88305 TISSUE EXAM BY PATHOLOGIST: CPT | Mod: TC | Performed by: SURGERY

## 2024-02-28 PROCEDURE — 370N000017 HC ANESTHESIA TECHNICAL FEE, PER MIN: Performed by: SURGERY

## 2024-02-28 PROCEDURE — 88305 TISSUE EXAM BY PATHOLOGIST: CPT | Mod: 26 | Performed by: PATHOLOGY

## 2024-02-28 PROCEDURE — 250N000009 HC RX 250: Performed by: NURSE ANESTHETIST, CERTIFIED REGISTERED

## 2024-02-28 PROCEDURE — 250N000011 HC RX IP 250 OP 636: Performed by: NURSE ANESTHETIST, CERTIFIED REGISTERED

## 2024-02-28 PROCEDURE — 45380 COLONOSCOPY AND BIOPSY: CPT | Mod: PT | Performed by: SURGERY

## 2024-02-28 PROCEDURE — 258N000003 HC RX IP 258 OP 636: Performed by: SURGERY

## 2024-02-28 RX ORDER — PROPOFOL 10 MG/ML
INJECTION, EMULSION INTRAVENOUS PRN
Status: DISCONTINUED | OUTPATIENT
Start: 2024-02-28 | End: 2024-02-28

## 2024-02-28 RX ORDER — NALOXONE HYDROCHLORIDE 0.4 MG/ML
0.2 INJECTION, SOLUTION INTRAMUSCULAR; INTRAVENOUS; SUBCUTANEOUS
Status: CANCELLED | OUTPATIENT
Start: 2024-02-28

## 2024-02-28 RX ORDER — NALOXONE HYDROCHLORIDE 0.4 MG/ML
0.4 INJECTION, SOLUTION INTRAMUSCULAR; INTRAVENOUS; SUBCUTANEOUS
Status: CANCELLED | OUTPATIENT
Start: 2024-02-28

## 2024-02-28 RX ORDER — FLUMAZENIL 0.1 MG/ML
0.2 INJECTION, SOLUTION INTRAVENOUS
Status: CANCELLED | OUTPATIENT
Start: 2024-02-28 | End: 2024-02-28

## 2024-02-28 RX ORDER — ONDANSETRON 2 MG/ML
4 INJECTION INTRAMUSCULAR; INTRAVENOUS
Status: DISCONTINUED | OUTPATIENT
Start: 2024-02-28 | End: 2024-02-28 | Stop reason: HOSPADM

## 2024-02-28 RX ORDER — SODIUM CHLORIDE, SODIUM LACTATE, POTASSIUM CHLORIDE, CALCIUM CHLORIDE 600; 310; 30; 20 MG/100ML; MG/100ML; MG/100ML; MG/100ML
INJECTION, SOLUTION INTRAVENOUS CONTINUOUS
Status: DISCONTINUED | OUTPATIENT
Start: 2024-02-28 | End: 2024-02-28 | Stop reason: HOSPADM

## 2024-02-28 RX ORDER — LIDOCAINE HYDROCHLORIDE 20 MG/ML
INJECTION, SOLUTION INFILTRATION; PERINEURAL PRN
Status: DISCONTINUED | OUTPATIENT
Start: 2024-02-28 | End: 2024-02-28

## 2024-02-28 RX ORDER — LIDOCAINE 40 MG/G
CREAM TOPICAL
Status: DISCONTINUED | OUTPATIENT
Start: 2024-02-28 | End: 2024-02-28 | Stop reason: HOSPADM

## 2024-02-28 RX ADMIN — PROPOFOL 200 MCG/KG/MIN: 10 INJECTION, EMULSION INTRAVENOUS at 08:39

## 2024-02-28 RX ADMIN — SODIUM CHLORIDE, POTASSIUM CHLORIDE, SODIUM LACTATE AND CALCIUM CHLORIDE: 600; 310; 30; 20 INJECTION, SOLUTION INTRAVENOUS at 08:10

## 2024-02-28 RX ADMIN — LIDOCAINE HYDROCHLORIDE 100 MG: 20 INJECTION, SOLUTION INFILTRATION; PERINEURAL at 08:37

## 2024-02-28 ASSESSMENT — ACTIVITIES OF DAILY LIVING (ADL)
ADLS_ACUITY_SCORE: 35
ADLS_ACUITY_SCORE: 35

## 2024-02-28 NOTE — LETTER
Jaime Griffiths     [facility] [id number]      March 1, 2024    Dear Jaime,  This letter is written to inform you of the results of your recent colonoscopy.  Your examination showed polyp(s) in your transverse colon. All polyps were removed in their entirety and sent for review by a pathologist. As you will see on the pathology report below, the tissue(s) were tubular adenomatous polyps. Your examination was otherwise without abnormality.    Adenomatous polyps are entirely benign (non-cancerous); however, patients who have developed these polyps are at an increased risk for developing additional polyps in the future. If these are not eventually removed, there is a risk of developing colon cancer. We will advise more frequent examinations with you because of the risk associated with this type of polyp.    Final Diagnosis   Colon, transverse, polypectomy:  --Tubular adenoma.        Given these findings, your personal history of polyps, I recommend that you undergo a repeat colonoscopy in 7 year(s) for surveillance. We will enter you into a recall system so you receive a reminder closer to the time that you are due for repeat examination.     Please remember that this recommendation is made with the understanding that you are not experiencing persistent changes in bowel function, bleeding per rectum, and/or significant abdominal pain. If you experience these symptoms, please contact your primary care provider for a further evaluation.     If you have any questions or concerns about the results of your colonoscopy or the appropriate follow-up, please contact my assistant at [phone]    Sincerely,      Mau Quinones,    [facility]  ___

## 2024-02-28 NOTE — ANESTHESIA CARE TRANSFER NOTE
Patient: Jaime Griffiths    Procedure: Procedure(s):  COLONOSCOPY, WITH POLYPECTOMY AND BIOPSY       Diagnosis: Screen for colon cancer [Z12.11]  Diagnosis Additional Information: No value filed.    Anesthesia Type:   General     Note:    Oropharynx: oropharynx clear of all foreign objects  Level of Consciousness: drowsy  Oxygen Supplementation: room air    Independent Airway: airway patency satisfactory and stable  Dentition: dentition unchanged  Vital Signs Stable: post-procedure vital signs reviewed and stable  Report to RN Given: handoff report given  Patient transferred to: Phase II    Handoff Report: Identifed the Patient, Identified the Reponsible Provider, Reviewed the pertinent medical history, Discussed the surgical course, Reviewed Intra-OP anesthesia mangement and issues during anesthesia, Set expectations for post-procedure period and Allowed opportunity for questions and acknowledgement of understanding      Vitals:  Vitals Value Taken Time   /74 02/28/24 0859   Temp     Pulse 59 02/28/24 0859   Resp     SpO2 95 % 02/28/24 0903   Vitals shown include unfiled device data.    Electronically Signed By: JARED Beach CRNA  February 28, 2024  9:04 AM

## 2024-02-28 NOTE — H&P
[facility]    Pre-Endoscopy History and Physical     Jaime Griffiths MRN# [id number]   YOB: 1965 Age: 58 year old     Date of Procedure: 2/28/2024  Primary care provider: Rd Gayle  Type of Endoscopy: Colonoscopy with possible biopsy, possible polypectomy  Reason for Procedure: Screening  Type of Anesthesia Anticipated: Conscious Sedation    HPI:    Jaime is a 58 year old male who will be undergoing the above procedure.      A history and physical has been performed. The patient's medications and allergies have been reviewed. The risks and benefits of the procedure and the sedation options and risks were discussed with the patient.  All questions were answered and informed consent was obtained.      He denies a personal or family history of anesthesia complications or bleeding disorders.     Patient Active Problem List   Diagnosis    Recurrent cold sores    Adenomatous polyp of colon    CARDIOVASCULAR SCREENING; LDL GOAL LESS THAN 160    HTN (hypertension)    Morbid obesity (H)    Elevated glucose    Hyperlipidemia LDL goal <130    Screening for prostate cancer    Tobacco use    Alcohol dependence (H)    Chronic gout of right hand, unspecified cause        Past Medical History:   Diagnosis Date    Allergic rhinitis, cause unspecified         Past Surgical History:   Procedure Laterality Date    ARTHROSCOPY KNEE Right 12/4/2014    Procedure: ARTHROSCOPY KNEE;  Surgeon: Wally Mckay MD;  Location: [facility]    SURGICAL HISTORY OF -       appendectomy       Social History     Tobacco Use    Smoking status: Never    Smokeless tobacco: Current     Types: Chew   Substance Use Topics    Alcohol use: Yes     Alcohol/week: 20.0 standard drinks of alcohol     Types: 24 Cans of beer per week     Comment: 15 beers per week       Family History   Problem Relation Age of Onset    Cancer - colorectal Maternal Grandmother     Diabetes Maternal Grandfather     Heart Disease Father 64         "onset       Prior to Admission medications    Medication Sig Start Date End Date Taking? Authorizing Provider   allopurinol (ZYLOPRIM) 100 MG tablet Take 2 tablets (200 mg) by mouth daily 11/1/23  Yes Rd Gayle MD   colchicine (COLCRYS) 0.6 MG tablet Take 1 tablet (0.6 mg) by mouth 2 times daily 11/1/23  Yes Rd Gayle MD   losartan (COZAAR) 50 MG tablet Take 1 tablet (50 mg) by mouth daily 11/1/23  Yes Rd Gayle MD       Allergies   Allergen Reactions    Ace Inhibitors Cough    Nka [No Known Allergies]         REVIEW OF SYSTEMS:   5 point ROS negative except as noted above in HPI, including Gen., Resp., CV, GI &  system review.    PHYSICAL EXAM:   BP (!) 145/79   Temp 98  F (36.7  C) (Oral)   Resp 18   Ht 1.778 m (5' 10\")   Wt 113.4 kg (250 lb)   SpO2 98%   BMI 35.87 kg/m   Estimated body mass index is 35.87 kg/m  as calculated from the following:    Height as of this encounter: 1.778 m (5' 10\").    Weight as of this encounter: 113.4 kg (250 lb).   Constitutional: Awake, alert, no acute distress.  Eyes: No scleral icterus.  Conjunctiva are without injection.  ENMT: Mucous membranes moist, dentition and gums are intact.   Neck: Soft, supple, trachea midline.    Endocrine: n/a   Lymphatic: There is no cervical, submandibularadenopathy.  Respiratory: normal effortgs   Cardiovascular: S1, S2  Abdomen: Non-distended, non-tender,  No masses,  Musculoskeletal: No spinal or CVA tenderness. Full range of motion in the upper and lower extremities.    Skin: No skin rashes or lesions to inspection.  No petechia.    Neurologic: alerted and oriented 3x  Psychiatric: The patient's affect is not blunted and mood is appropriate.  DIAGNOSTICS:    Not indicated    IMPRESSION   ASA Class 2 - Mild systemic disease    PLAN:   Plan for Colonoscopy with possible biopsy, possible polypectomy. We discussed the risks, benefits and alternatives and the patient wished to proceed.  Patient is cleared for the above " procedure.    The above has been forwarded to the consulting provider.    Mau Quinones DO  [facility]

## 2024-02-28 NOTE — ANESTHESIA POSTPROCEDURE EVALUATION
Patient: Jaime Griffiths    Procedure: Procedure(s):  COLONOSCOPY, WITH POLYPECTOMY AND BIOPSY       Anesthesia Type:  General    Note:  Disposition: Outpatient   Postop Pain Control: Uneventful            Sign Out: Well controlled pain   PONV: No   Neuro/Psych: Uneventful            Sign Out: Acceptable/Baseline neuro status   Airway/Respiratory: Uneventful            Sign Out: Acceptable/Baseline resp. status   CV/Hemodynamics: Uneventful            Sign Out: Acceptable CV status; No obvious hypovolemia; No obvious fluid overload   Other NRE: NONE   DID A NON-ROUTINE EVENT OCCUR? No           Last vitals:  Vitals Value Taken Time   /87 02/28/24 0920   Temp     Pulse 59 02/28/24 0920   Resp 16 02/28/24 0910   SpO2 99 % 02/28/24 0929   Vitals shown include unfiled device data.    Electronically Signed By: JARED Beach CRNA  February 28, 2024  9:31 AM

## 2024-03-01 LAB
PATH REPORT.COMMENTS IMP SPEC: NORMAL
PATH REPORT.COMMENTS IMP SPEC: NORMAL
PATH REPORT.FINAL DX SPEC: NORMAL
PATH REPORT.GROSS SPEC: NORMAL
PATH REPORT.MICROSCOPIC SPEC OTHER STN: NORMAL
PATH REPORT.RELEVANT HX SPEC: NORMAL
PHOTO IMAGE: NORMAL

## 2024-10-25 ENCOUNTER — TELEPHONE (OUTPATIENT)
Dept: FAMILY MEDICINE | Facility: CLINIC | Age: 59
End: 2024-10-25
Payer: COMMERCIAL

## 2024-10-25 DIAGNOSIS — I10 HYPERTENSION, UNSPECIFIED TYPE: ICD-10-CM

## 2024-10-25 NOTE — TELEPHONE ENCOUNTER
Pending Prescriptions:                       Disp   Refills    losartan (COZAAR) 50 MG tablet [Pharmacy M*90 tab*0        Sig: TAKE ONE TABLET BY MOUTH ONCE DAILY    Routing refill request to provider for review/approval because:  Labs not current:  GFR, potassium    Naomi Camilo RN  [facility]

## 2024-10-25 NOTE — TELEPHONE ENCOUNTER
Contacted Jaime to inquire when the sleep study was done. He did it last Sunday night. He will upload the results to his my chart. Dr Gayle, once you review his results, will you be able to prescribe a CPAP if all the information is there? Or does he need to schedule an appt with you first? Patient aware Dr Gayle is not in clinic today and will not have an answer until next week.   Thanks, Caitlin FAN RN

## 2024-10-25 NOTE — TELEPHONE ENCOUNTER
Order/Referral Request    Who is requesting: patient     Orders being requested: cpap machine     Reason service is needed/diagnosis: [place]-- [place] he provide a copy to clinic.     If patient schedules a visit would you be willing to order cpap machine and supplies? Patient plans to purchase cpap with [facility], but should he see sleep clinic?    When are orders needed by: before december    Has this been discussed with Provider: No    Does patient have a preference on a Group/Provider/Facility? [facility]    Does patient have an appointment scheduled?: No    Where to send orders: [facility]     Could we [facility] you [facility] or would you prefer to receive a phone call?:   Patient would prefer a phone call   Okay to leave a detailed message?: Yes at Cell number on file:    Telephone Information:   Mobile [phone]

## 2024-10-28 RX ORDER — LOSARTAN POTASSIUM 50 MG/1
50 TABLET ORAL DAILY
Qty: 30 TABLET | Refills: 0 | Status: SHIPPED | OUTPATIENT
Start: 2024-10-28 | End: 2024-11-11

## 2024-10-30 NOTE — TELEPHONE ENCOUNTER
Called and spoke with pt. Pt made appt 11/11 with Dr. Gayle to discuss cpap machine and supplies. Pt okay with this.    Radha Yu Patient

## 2024-11-11 ENCOUNTER — PATIENT OUTREACH (OUTPATIENT)
Dept: CARE COORDINATION | Facility: CLINIC | Age: 59
End: 2024-11-11

## 2024-11-11 ENCOUNTER — OFFICE VISIT (OUTPATIENT)
Dept: FAMILY MEDICINE | Facility: CLINIC | Age: 59
End: 2024-11-11
Payer: COMMERCIAL

## 2024-11-11 VITALS
BODY MASS INDEX: 37.51 KG/M2 | HEIGHT: 70 IN | OXYGEN SATURATION: 97 % | WEIGHT: 262 LBS | DIASTOLIC BLOOD PRESSURE: 84 MMHG | HEART RATE: 58 BPM | TEMPERATURE: 97.5 F | SYSTOLIC BLOOD PRESSURE: 138 MMHG | RESPIRATION RATE: 16 BRPM

## 2024-11-11 DIAGNOSIS — R73.09 ELEVATED GLUCOSE: ICD-10-CM

## 2024-11-11 DIAGNOSIS — E78.5 HYPERLIPIDEMIA LDL GOAL <130: Primary | ICD-10-CM

## 2024-11-11 DIAGNOSIS — E66.01 MORBID OBESITY (H): ICD-10-CM

## 2024-11-11 DIAGNOSIS — I10 HYPERTENSION, UNSPECIFIED TYPE: ICD-10-CM

## 2024-11-11 DIAGNOSIS — G47.33 OBSTRUCTIVE SLEEP APNEA SYNDROME: ICD-10-CM

## 2024-11-11 PROBLEM — F10.20 ALCOHOL DEPENDENCE (H): Status: RESOLVED | Noted: 2023-11-01 | Resolved: 2024-11-11

## 2024-11-11 LAB
ANION GAP SERPL CALCULATED.3IONS-SCNC: 10 MMOL/L (ref 7–15)
BUN SERPL-MCNC: 21.3 MG/DL (ref 6–20)
CALCIUM SERPL-MCNC: 9.4 MG/DL (ref 8.8–10.4)
CHLORIDE SERPL-SCNC: 103 MMOL/L (ref 98–107)
CHOLEST SERPL-MCNC: 238 MG/DL
CREAT SERPL-MCNC: 1.07 MG/DL (ref 0.67–1.17)
EGFRCR SERPLBLD CKD-EPI 2021: 80 ML/MIN/1.73M2
EST. AVERAGE GLUCOSE BLD GHB EST-MCNC: 108 MG/DL
FASTING STATUS PATIENT QL REPORTED: YES
FASTING STATUS PATIENT QL REPORTED: YES
GLUCOSE SERPL-MCNC: 108 MG/DL (ref 70–99)
HBA1C MFR BLD: 5.4 % (ref 0–5.6)
HCO3 SERPL-SCNC: 27 MMOL/L (ref 22–29)
HDLC SERPL-MCNC: 46 MG/DL
LDLC SERPL CALC-MCNC: 163 MG/DL
NONHDLC SERPL-MCNC: 192 MG/DL
POTASSIUM SERPL-SCNC: 4.7 MMOL/L (ref 3.4–5.3)
SODIUM SERPL-SCNC: 140 MMOL/L (ref 135–145)
TRIGL SERPL-MCNC: 147 MG/DL

## 2024-11-11 PROCEDURE — 83036 HEMOGLOBIN GLYCOSYLATED A1C: CPT | Performed by: FAMILY MEDICINE

## 2024-11-11 PROCEDURE — G2211 COMPLEX E/M VISIT ADD ON: HCPCS | Performed by: FAMILY MEDICINE

## 2024-11-11 PROCEDURE — 80048 BASIC METABOLIC PNL TOTAL CA: CPT | Performed by: FAMILY MEDICINE

## 2024-11-11 PROCEDURE — 99214 OFFICE O/P EST MOD 30 MIN: CPT | Performed by: FAMILY MEDICINE

## 2024-11-11 PROCEDURE — 36415 COLL VENOUS BLD VENIPUNCTURE: CPT | Performed by: FAMILY MEDICINE

## 2024-11-11 PROCEDURE — 80061 LIPID PANEL: CPT | Performed by: FAMILY MEDICINE

## 2024-11-11 RX ORDER — LOSARTAN POTASSIUM 50 MG/1
50 TABLET ORAL DAILY
Qty: 90 TABLET | Refills: 3 | Status: SHIPPED | OUTPATIENT
Start: 2024-11-11

## 2024-11-11 ASSESSMENT — PAIN SCALES - GENERAL: PAINLEVEL_OUTOF10: NO PAIN (0)

## 2024-11-11 NOTE — PROGRESS NOTES
"S: Jaime Griffiths is a 59 year old male with htn.  Fills and labs.  Losartan    Gout: confirmed on aspiration.  Was given allopurinol, but not on that currently.  Doing well without treatment for the time being    Morbid obesity: ongoing, 37 BMI with htn    Sleep apnea: 16 on AHI, done for DOT.  Needs script for supplies, can't get into sleep center for several months    Elevated glucose: recheck    O:/84   Pulse 58   Temp 97.5  F (36.4  C) (Tympanic)   Resp 16   Ht 1.778 m (5' 10\")   Wt 118.8 kg (262 lb)   SpO2 97%   BMI 37.59 kg/m    GEN: Alert and oriented, in no acute distress  CV: RRR, no murmur  EXT: no edema or lesions noted in lower extremities  Normal gait    A: htn.  Stable   Gout: confirmed on aspiration. Stable off meds  Morbid obesity: ongoing, 37 BMI with htn  Sleep apnea: 16 on AHI,new diagnosis  Elevated glucose: recheck    P: fills on losartan. Update labs.  Cpap supplies DME script written for, started with average pressure of 10.  Further follow up with sleep center when able     Work on wt loss.      The longitudinal plan of care for the diagnosis(es)/condition(s) as documented were addressed during this visit. Due to the added complexity in care, I will continue to support Jaime in the subsequent management and with ongoing continuity of care.    "

## 2024-11-25 ENCOUNTER — PATIENT OUTREACH (OUTPATIENT)
Dept: CARE COORDINATION | Facility: CLINIC | Age: 59
End: 2024-11-25
Payer: COMMERCIAL

## 2024-12-26 ENCOUNTER — TELEPHONE (OUTPATIENT)
Dept: FAMILY MEDICINE | Facility: CLINIC | Age: 59
End: 2024-12-26
Payer: COMMERCIAL

## 2024-12-26 NOTE — TELEPHONE ENCOUNTER
Received a faxed from [facility]  Fax [phone]  Phone [phone] ext [phone]      Requesting orders for cpap and supplies  and copy of patient baseline/split night (diagnostic) sleep study.  Fax [phone]    At my desk.     Nyla GONZALEZ  Station

## 2024-12-26 NOTE — TELEPHONE ENCOUNTER
"OV Note 11/11/24: \"Sleep apnea: 16 on AHI, done for DOT. Needs script for supplies, can't get into sleep center for several months\"    CPAP Orders placed 11/11/24.  Alexandra Ortiz RN on 12/26/2024 at 10:58 AM    "

## 2025-02-01 ENCOUNTER — HEALTH MAINTENANCE LETTER (OUTPATIENT)
Age: 60
End: 2025-02-01

## 2025-06-09 ENCOUNTER — PATIENT OUTREACH (OUTPATIENT)
Dept: CARE COORDINATION | Facility: CLINIC | Age: 60
End: 2025-06-09
Payer: COMMERCIAL

## (undated) DEVICE — ENDO FORCEP ENDOJAW BIOPSY 3.7MMX230CM FB-222U

## (undated) RX ORDER — PROPOFOL 10 MG/ML
INJECTION, EMULSION INTRAVENOUS
Status: DISPENSED
Start: 2024-02-28